# Patient Record
Sex: MALE | Race: WHITE | NOT HISPANIC OR LATINO | Employment: FULL TIME | URBAN - METROPOLITAN AREA
[De-identification: names, ages, dates, MRNs, and addresses within clinical notes are randomized per-mention and may not be internally consistent; named-entity substitution may affect disease eponyms.]

---

## 2017-06-20 RX ORDER — HYDROCODONE BITARTRATE AND ACETAMINOPHEN 7.5; 325 MG/1; MG/1
1 TABLET ORAL 2 TIMES DAILY PRN
COMMUNITY
End: 2017-09-12

## 2017-06-21 ENCOUNTER — OFFICE VISIT (OUTPATIENT)
Dept: NEUROSURGERY | Facility: CLINIC | Age: 27
End: 2017-06-21

## 2017-06-21 VITALS — BODY MASS INDEX: 43.67 KG/M2 | HEIGHT: 70 IN | TEMPERATURE: 97.9 F | WEIGHT: 305 LBS

## 2017-06-21 DIAGNOSIS — M54.16 LUMBAR RADICULOPATHY, RIGHT: Primary | ICD-10-CM

## 2017-06-21 DIAGNOSIS — M47.816 FACET ARTHRITIS OF LUMBAR REGION: ICD-10-CM

## 2017-06-21 DIAGNOSIS — M51.36 DDD (DEGENERATIVE DISC DISEASE), LUMBAR: ICD-10-CM

## 2017-06-21 PROCEDURE — 99243 OFF/OP CNSLTJ NEW/EST LOW 30: CPT | Performed by: NEUROLOGICAL SURGERY

## 2017-06-21 RX ORDER — CYCLOBENZAPRINE HCL 10 MG
10 TABLET ORAL 3 TIMES DAILY PRN
COMMUNITY
End: 2017-09-12

## 2017-06-21 RX ORDER — DICLOFENAC SODIUM 75 MG/1
75 TABLET, DELAYED RELEASE ORAL 2 TIMES DAILY
Qty: 60 TABLET | Refills: 0 | Status: SHIPPED | OUTPATIENT
Start: 2017-06-21 | End: 2017-08-18 | Stop reason: SDUPTHER

## 2017-06-21 RX ORDER — GABAPENTIN 300 MG/1
300 CAPSULE ORAL 3 TIMES DAILY
Qty: 90 CAPSULE | Refills: 0 | Status: SHIPPED | OUTPATIENT
Start: 2017-06-21 | End: 2017-08-18 | Stop reason: SDUPTHER

## 2017-06-21 RX ORDER — DIPHENHYDRAMINE HCL 25 MG
25 CAPSULE ORAL EVERY 6 HOURS PRN
Status: ON HOLD | COMMUNITY
End: 2019-10-03

## 2017-06-21 NOTE — PROGRESS NOTES
"Patient: John Chen  : 1990    Primary Care Provider: Sahil Burnette MD    Requesting Provider: As above      History    Chief Complaint: Back and right leg pain with sensory alteration.    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  He has had pain mostly in the right lower back that extends into the hip and then down the leg where it extends into the top of the right foot.  He's had some tingling and intermittent numbness in the foot as well.  He has no left-sided symptoms.  He reports no bowel or bladder difficulty.  He's been treated with Norco and 2 weeks of physical therapy.  He is worse with standing.  Sitting is also aggravating.  Sometimes he gets relief by lying down.  He has been off work.    Review of Systems   Constitutional: Negative for activity change, appetite change, chills, diaphoresis, fatigue, fever and unexpected weight change.   HENT: Positive for congestion, rhinorrhea, sinus pressure, sneezing and tinnitus. Negative for dental problem, drooling, ear discharge, ear pain, facial swelling, hearing loss, mouth sores, nosebleeds, postnasal drip, sore throat, trouble swallowing and voice change.    Eyes: Negative for photophobia, pain, discharge, redness, itching and visual disturbance.   Respiratory: Positive for cough. Negative for apnea, choking, chest tightness, shortness of breath, wheezing and stridor.    Cardiovascular: Negative for chest pain, palpitations and leg swelling.   Gastrointestinal: Negative for abdominal distention, abdominal pain, anal bleeding, blood in stool, constipation, diarrhea, nausea, rectal pain and vomiting.   Musculoskeletal: Positive for arthralgias, back pain, myalgias, neck pain and neck stiffness. Negative for gait problem and joint swelling.   Skin: Negative for color change, pallor, rash and wound.   Allergic/Immunologic: " "Positive for environmental allergies. Negative for food allergies and immunocompromised state.   Neurological: Positive for headaches. Negative for dizziness, tremors, seizures, syncope, facial asymmetry, speech difficulty, weakness, light-headedness and numbness.   Hematological: Negative for adenopathy. Does not bruise/bleed easily.   Psychiatric/Behavioral: Negative for agitation, behavioral problems, confusion, decreased concentration, dysphoric mood, self-injury, sleep disturbance and suicidal ideas. The patient is not nervous/anxious and is not hyperactive.        The patient's past medical history, past surgical history, family history, and social history have been reviewed at length in the electronic medical record.    Physical Exam:   Temp 97.9 °F (36.6 °C)  Ht 70\" (177.8 cm)  Wt (!) 305 lb (138 kg)  BMI 43.76 kg/m2  CONSTITUTIONAL: Patient is well-nourished, pleasant and appears stated age.  CV: Heart regular rate and rhythm without murmur, rub, or gallop.  PULMONARY: Lungs are clear to ascultation.  MUSCULOSKELETAL:  Straight leg raising is negative.  Sammy's Sign is moderately positive on the right.  ROM in back somewhat limited in flexion and extension.  Tenderness in the back to palpation is rather prominent in the midline and off to the right in the lumbosacral spine  NEUROLOGICAL:  Orientation, memory, attention span, language function, and cognition have been examined and are intact.  Strength is intact in the lower extremities to direct testing.  Muscle tone is normal throughout.  Station and gait are normal.  Sensation is intact to light touch testing throughout.  Deep tendon reflexes are 2+ and symmetrical.  Coordination is intact.      Medical Decision Making    Data Review:   MRI of the lumbar spine dated 4/24/17 demonstrates multilevel degenerative disc disease.  There is mild central disc bulging at L3-4.  Somewhat more broad-based disc bulging at L4-5.  There is broad-based but more " leftward disc bulging at L5-S1.  There is a left paracentral annular fissure at this level.  High-grade root or canal compromise is not observed.    Diagnosis:   1.  Lumbar radiculopathy.  2.  Lumbar degenerative disc disease.  3.  Lumbar degenerative joint disease.    Treatment Options:   I have prescribed Neurontin and diclofenac.  The patient will continue to undergo physical therapy that will include traction.  He is to be off work until follow-up in several weeks.  If his symptoms persist or worsen then a referral for epidural injections would be a consideration.       Diagnosis Plan   1. Lumbar radiculopathy, right  gabapentin (NEURONTIN) 300 MG capsule   2. DDD (degenerative disc disease), lumbar  Ambulatory Referral to Physical Therapy Evaluate and treat, Neuro   3. Facet arthritis of lumbar region  diclofenac (VOLTAREN) 75 MG EC tablet           I, Dr. Wallace, personally performed the services described in the documentation, as scribed in my presence, and it is both accurate and complete.  Scribed for Ernesto Wallace MD by Pankaj Simms CMA on 06/21/2017 at 10:01 AM

## 2017-07-21 ENCOUNTER — OFFICE VISIT (OUTPATIENT)
Dept: NEUROSURGERY | Facility: CLINIC | Age: 27
End: 2017-07-21

## 2017-07-21 VITALS — TEMPERATURE: 97.5 F | WEIGHT: 304 LBS | HEIGHT: 70 IN | BODY MASS INDEX: 43.52 KG/M2

## 2017-07-21 DIAGNOSIS — M51.36 DDD (DEGENERATIVE DISC DISEASE), LUMBAR: Primary | ICD-10-CM

## 2017-07-21 DIAGNOSIS — M54.16 LUMBAR RADICULOPATHY, RIGHT: ICD-10-CM

## 2017-07-21 DIAGNOSIS — M25.551 PAIN OF RIGHT HIP JOINT: ICD-10-CM

## 2017-07-21 DIAGNOSIS — M47.816 FACET ARTHRITIS OF LUMBAR REGION: ICD-10-CM

## 2017-07-21 PROCEDURE — 99213 OFFICE O/P EST LOW 20 MIN: CPT | Performed by: NEUROLOGICAL SURGERY

## 2017-07-21 NOTE — PROGRESS NOTES
Patient: John Chen  : 1990    Primary Care Provider: Sahil Burnette MD    Requesting Provider: As above        History    Chief Complaint: Back and right leg pain with sensory alteration.    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  The pain extends from his back and into his right hip and subsequently down the right leg where he has some sensory alteration.  The Neurontin is making him sleepy but he has continued taking it.  The diclofenac is helpful.  Overall his symptoms are not much changed.  He denies left lower extremity symptoms.    Review of Systems   Constitutional: Negative for activity change, appetite change, chills, diaphoresis, fatigue, fever and unexpected weight change.   HENT: Positive for congestion, rhinorrhea and sneezing. Negative for dental problem, drooling, ear discharge, ear pain, facial swelling, hearing loss, mouth sores, nosebleeds, postnasal drip, sinus pressure, sore throat, tinnitus, trouble swallowing and voice change.    Eyes: Negative for photophobia, pain, discharge, redness, itching and visual disturbance.   Respiratory: Negative for apnea, cough, choking, chest tightness, shortness of breath, wheezing and stridor.    Cardiovascular: Negative for chest pain, palpitations and leg swelling.   Gastrointestinal: Negative for abdominal distention, abdominal pain, anal bleeding, blood in stool, constipation, diarrhea, nausea, rectal pain and vomiting.   Musculoskeletal: Positive for arthralgias and back pain. Negative for gait problem, joint swelling, myalgias, neck pain and neck stiffness.   Skin: Negative for color change, pallor, rash and wound.   Allergic/Immunologic: Positive for environmental allergies. Negative for food allergies and immunocompromised state.   Neurological: Positive for facial asymmetry and numbness. Negative for dizziness, tremors,  "seizures, syncope, speech difficulty, weakness, light-headedness and headaches.   Hematological: Negative for adenopathy. Does not bruise/bleed easily.   Psychiatric/Behavioral: Negative for agitation, behavioral problems, confusion, decreased concentration, dysphoric mood, self-injury, sleep disturbance and suicidal ideas. The patient is not nervous/anxious and is not hyperactive.        The patient's past medical history, past surgical history, family history, and social history have been reviewed at length in the electronic medical record.    Physical Exam:   Temp 97.5 °F (36.4 °C)  Ht 70\" (177.8 cm)  Wt (!) 304 lb (138 kg)  BMI 43.62 kg/m2  MUSCULOSKELETAL:  Straight leg raising is somewhat positive on the right and negative on the left.  Sammy's Sign is negative.  ROM in back normal.  Tenderness in the back to palpation is not observed.  NEUROLOGICAL:  Strength is intact in the lower extremities to direct testing.  Muscle tone is normal throughout.  Station and gait are normal.  Sensation is intact to light touch testing throughout.      Medical Decision Making    Diagnosis:   The patient has right lower extremity radicular symptoms.  He has diffuse degenerative changes in his back with some degree of disc protrusion at multiple levels.  This tends to be more leftward however.    Treatment Options:   I have referred the patient for an epidural injection or 2.  He is to be off work until follow-up thereafter.  If he continues to struggle then I'll probably set up a lumbar CT myelogram to see if there is renny nerve root compromise on the right.       Diagnosis Plan   1. DDD (degenerative disc disease), lumbar  Ambulatory Referral to Pain Management   2. Facet arthritis of lumbar region     3. Lumbar radiculopathy, right     4. Pain of right hip joint             I, Dr. Wallace, personally performed the services described in the documentation, as scribed in my presence, and it is both accurate and " complete.  Scribed for Ernesto Wallace MD by Pankaj Simms CMA on 07/21/2017 at 3:14 PM

## 2017-08-15 ENCOUNTER — TELEPHONE (OUTPATIENT)
Dept: NEUROSURGERY | Facility: CLINIC | Age: 27
End: 2017-08-15

## 2017-08-15 NOTE — TELEPHONE ENCOUNTER
Per Dr. Wallace's note on 07/21/17:  I have referred the patient for an epidural injection or 2.  He is to be off work until follow-up thereafter.  If he continues to struggle then I'll probably set up a lumbar CT myelogram to see if there is renny nerve root compromise on the right.    Since the feedback from the pain management practice reflects that the patient will not be seen for HERMINIO injections for at least 2 months, we can proceed with lumbar myelogram is w/c approves.

## 2017-08-16 NOTE — TELEPHONE ENCOUNTER
Received a call back from the  on the W/C account.  She said that they are willing to approve another referral to another office (one that can get him in sooner) or they would need a request for the Myelo with the office note for approval.  It depends on what Dr. Wallace would like to do.  If you could ask him and let me know, thanks!

## 2017-08-17 DIAGNOSIS — M54.16 LUMBAR RADICULOPATHY, RIGHT: ICD-10-CM

## 2017-08-17 DIAGNOSIS — M47.816 FACET ARTHRITIS OF LUMBAR REGION: ICD-10-CM

## 2017-08-17 NOTE — TELEPHONE ENCOUNTER
Provider:  Rodrigo  Caller: John Chen  Time of call:   02:48 PM  Phone #:  [phone]  Last visit:   07/21/17  Next visit: not scheduled yet, pending pain mgmt    Reason for call:       Pt received a summons for jury duty, would like to know if we can write him a letter excusing him from jury duty due to his back issues, doesn't think he could sit on a hard bench all day.

## 2017-08-18 RX ORDER — DICLOFENAC SODIUM 75 MG/1
75 TABLET, DELAYED RELEASE ORAL 2 TIMES DAILY
Qty: 60 TABLET | Refills: 0 | Status: SHIPPED | OUTPATIENT
Start: 2017-08-18 | End: 2017-08-28 | Stop reason: SDUPTHER

## 2017-08-18 RX ORDER — GABAPENTIN 300 MG/1
300 CAPSULE ORAL 3 TIMES DAILY
Qty: 90 CAPSULE | Refills: 0 | OUTPATIENT
Start: 2017-08-18 | End: 2017-11-16 | Stop reason: SDUPTHER

## 2017-08-18 NOTE — TELEPHONE ENCOUNTER
We are not going to approve a letter for jury duty.  He can take antiinflammatories, use ice/heat for his symptoms.      Called and he was not home to receive my message.  Person who answered phone said she would tell him to call back when he gets home.

## 2017-08-18 NOTE — TELEPHONE ENCOUNTER
I spoke to Dr. Wallace, he said that we can refer him to another pain mgmt clinic, he suggested Dr. Sorensen but said wherever the patient wants to go, I know he lives in [facility], so you might ask if there's somewhere closer to home that he would like to be referred to

## 2017-08-18 NOTE — TELEPHONE ENCOUNTER
Called pt- he is aware.   He request RF for Gabapentin 300mg TID   and Diclofenac 75mg BID.        NIKKI:  04/19/2017 Hydrocodone/Acetaminophen  325MG/7.5MG  1990 30 15 [id number] 15 1  05/09/2017 Hydrocodone/Acetaminophen  325MG/7.5MG  1990 30 15 [id number] 15 1

## 2017-08-21 NOTE — TELEPHONE ENCOUNTER
JOHANNA pt today about the situation.  Pt is ok with referral to [facility] and [facility] is aware to resend for a new auth.    While I was on the phone with the patient he mentioned that he needed a refill on his Gabapentin and Diclofenac.  He has 5 days worth of Gabapentin and 2 pills left on the other.

## 2017-08-28 ENCOUNTER — TELEPHONE (OUTPATIENT)
Dept: NEUROSURGERY | Facility: CLINIC | Age: 27
End: 2017-08-28

## 2017-08-28 DIAGNOSIS — M47.816 FACET ARTHRITIS OF LUMBAR REGION: ICD-10-CM

## 2017-08-28 RX ORDER — DICLOFENAC SODIUM 75 MG/1
75 TABLET, DELAYED RELEASE ORAL 2 TIMES DAILY
Qty: 60 TABLET | Refills: 0 | Status: SHIPPED | OUTPATIENT
Start: 2017-08-28 | End: 2017-11-16 | Stop reason: SDUPTHER

## 2017-08-28 NOTE — TELEPHONE ENCOUNTER
Provider:  Rodrigo Gauthier  Caller:   Time of call:     Phone #:    Surgery:    Surgery Date:    Last visit:   07/21/17  Next visit: NA    Reason for call:         ----- Message from Yohana Murrieta sent at 8/28/2017 10:35 AM EDT -----  Contact: [phone]  PATIENT CALLING TO REQUEST A REFILL OF DICLOFENAC B.I.D.      PATIENT IS IN [facility].

## 2017-08-29 ENCOUNTER — TELEPHONE (OUTPATIENT)
Dept: PAIN MEDICINE | Facility: CLINIC | Age: 27
End: 2017-08-29

## 2017-09-12 ENCOUNTER — OFFICE VISIT (OUTPATIENT)
Dept: PAIN MEDICINE | Facility: CLINIC | Age: 27
End: 2017-09-12

## 2017-09-12 VITALS
HEART RATE: 96 BPM | SYSTOLIC BLOOD PRESSURE: 133 MMHG | HEIGHT: 70 IN | RESPIRATION RATE: 20 BRPM | BODY MASS INDEX: 44.67 KG/M2 | WEIGHT: 312 LBS | TEMPERATURE: 97.2 F | DIASTOLIC BLOOD PRESSURE: 85 MMHG | OXYGEN SATURATION: 96 %

## 2017-09-12 DIAGNOSIS — M47.816 SPONDYLOSIS OF LUMBAR REGION WITHOUT MYELOPATHY OR RADICULOPATHY: ICD-10-CM

## 2017-09-12 DIAGNOSIS — E66.01 MORBID OBESITY DUE TO EXCESS CALORIES (HCC): Primary | ICD-10-CM

## 2017-09-12 DIAGNOSIS — F17.210 CIGARETTE NICOTINE DEPENDENCE WITHOUT COMPLICATION: ICD-10-CM

## 2017-09-12 DIAGNOSIS — M51.26 DISPLACEMENT OF LUMBAR INTERVERTEBRAL DISC: ICD-10-CM

## 2017-09-12 DIAGNOSIS — E66.01 MORBID OBESITY DUE TO EXCESS CALORIES (HCC): ICD-10-CM

## 2017-09-12 DIAGNOSIS — M48.061 SPINAL STENOSIS OF LUMBAR REGION: ICD-10-CM

## 2017-09-12 DIAGNOSIS — M51.26 LUMBAR DISCOGENIC PAIN SYNDROME: ICD-10-CM

## 2017-09-12 PROCEDURE — 99203 OFFICE O/P NEW LOW 30 MIN: CPT | Performed by: ANESTHESIOLOGY

## 2017-09-12 PROCEDURE — 99406 BEHAV CHNG SMOKING 3-10 MIN: CPT | Performed by: ANESTHESIOLOGY

## 2017-09-12 RX ORDER — CETIRIZINE HYDROCHLORIDE 5 MG/1
10 TABLET ORAL DAILY
Status: ON HOLD | COMMUNITY
End: 2019-10-03

## 2017-09-12 NOTE — PROGRESS NOTES
"Chief Complaint: \"Pain in my lower back and in my legs.\"    History of Present Illness:   Patient: Mr. John Chen, 27 y.o. male   Referring physician: Dr. Ernesto Wallace  Reason for referral: Consultation for intractable chronic lower back pain radiating into both hips and legs.  Pain history: Patient reports a 5-month history of pain, which began after a work related incident repositioning a resident on 4/12/17. Pain has progressed in intensity over the past 5 months.   Pain description: Constant pain with intermittent exacerbation, described as burning, sharp and throbbing sensation.   Radiation of pain: The lower back pain radiates into the anterior and lateral aspect of the hips, anterior aspect of his thighs, and on the left side goes down the lateral aspect of the leg and into the dorsum of his left foot.  Pain intensity today: 6/10  Average pain intensity last week: 7/10  Pain intensity ranges from: 5/10 to 8/10  Aggravating factors: Pain increases with twisting, bending, sitting longer than 30-45 minutes, standing longer than 30-45 minutes, ambulating more than 5-10 minutes and remaining in one position.   Alleviating factors: Pain decreases with lying, heat and ice, massage, changing positions.   Associated symptoms:   Patient denies numbness or weakness in his lower extremities except for intermittent numbness in his feet   Patient denies any new bladder or bowel problems.   Patient denies difficulties with his balance or recent falls.     Review of previous therapies and additional medical records:  John Chen has already failed the following measures, including:   Conservative measures: oral analgesics, opioids, massage, physical therapy, ice, heat and traction   Interventional measures: None  Surgical measures: No previous lumbar spine surgery   John Chen underwent neurosurgical consultation with Dr. Ernesto Wallace on 07/21/2017, and was found not to be a surgical candidate.  John Chen presents with " significant comorbidities including nicotine addiction, not engaged in treatment, morbid obesity, asthma, possible alcohol abuse, use engaged in treatment.  In terms of current analgesics, John Chne takes: Diclofenac,Gabapentin  I have reviewed [facility] Report #[id number] consistent to medication reconciliation.    Review of diagnostic Studies:    MRI Lumbar Spine without Contrast, 04/25/2017. I have reviewed images of his MRI. Lumbar vertebral bodies are normal in height and alignment.  Mild spondylosis. The conus medial areas terminates at L1. Moderate degenerative disc disease at L3-L4, L4-L5 and L5-S1, with moderate diffuse bulging annulus. Mild to moderate facet arthropathy at L3-L4, L4-L5 and L5-S1, bilaterally, with ligamentum flavum buckling. Mild central spinal canal stenosis and lateral recess stenosis at L3-L4, L4-L5 and L5-S1.  L3-L4: Small broad-based central disc protrusion with mild impingement upon the anterior thecal sac.  L4-L5: Central disc protrusion without significant impingement upon the thecal sac.  L5-S1: Left paracentral disc protrusion with mild impingement upon the anterior thecal sac.    Review of Systems   Musculoskeletal: Positive for arthralgias and back pain.   All other systems reviewed and are negative.     Patient Active Problem List   Diagnosis   • Spondylosis of lumbar region without myelopathy or radiculopathy   • Displacement of lumbar intervertebral disc   • Morbid obesity due to excess calories   • Cigarette nicotine dependence without complication   • Spinal stenosis of lumbar region   • Lumbar discogenic pain syndrome       Past Medical History:   Diagnosis Date   • Asthma    • Extremity pain    • Joint pain    • Low back pain          Past Surgical History:   Procedure Laterality Date   • TONSILLECTOMY AND ADENOIDECTOMY           Family History   Problem Relation Age of Onset   • No Known Problems Mother    • Hypertension Father          Social History     Social History  "  • Marital status: Single     Spouse name: N/A   • Number of children: N/A   • Years of education: N/A     Social History Main Topics   • Smoking status: Current Every Day Smoker     Packs/day: 0.50     Types: Cigarettes   • Smokeless tobacco: None   • Alcohol use Yes   • Drug use: No   • Sexual activity: Not Asked     Other Topics Concern   • None     Social History Narrative        Current Outpatient Prescriptions:   •  cetirizine (zyrTEC) 5 MG tablet, Take 10 mg by mouth Daily., Disp: , Rfl:   •  diclofenac (VOLTAREN) 75 MG EC tablet, Take 1 tablet by mouth 2 (Two) Times a Day., Disp: 60 tablet, Rfl: 0  •  diphenhydrAMINE (BENADRYL) 25 mg capsule, Take 25 mg by mouth Every 6 (Six) Hours As Needed for Itching., Disp: , Rfl:   •  gabapentin (NEURONTIN) 300 MG capsule, Take 1 capsule by mouth 3 (Three) Times a Day., Disp: 90 capsule, Rfl: 0      No Known Allergies      /85 (BP Location: Left arm, Patient Position: Sitting)  Pulse 96  Temp 97.2 °F (36.2 °C) (Temporal Artery )   Resp 20  Ht 70\" (177.8 cm)  Wt (!) 312 lb (142 kg)  SpO2 96%  BMI 44.77 kg/m2      Physical Exam:  Constitutional: Patient is oriented to person, place, and time. Vital signs are normal. Patient appears well-developed and well-nourished.   HENT: Head: Normocephalic and atraumatic. Eyes: Conjunctivae and lids are normal. Pupils: Equal, round, reactive to light.   Neck: Trachea normal. Neck supple. No JVD present.   Pulmonary Respiratory effort: No increased work of breathing or signs of respiratory distress. Auscultation of lungs: Clear to auscultation.   Cardiovascular Auscultation of heart: Normal rate and rhythm, normal S1 and S2, no murmurs.   Abdomen: The abdomen was soft and nontender. Bowel sounds were normal.   Musculoskeletal   Gait and station: Gait evaluation demonstrated a normal gait   Lumbar spine: The range of motion of the lumbar spine is limited secondary to pain. Extension, flexion, lateral flexion, rotation of " the lumbar spine increased and reproduced pain. Lumbar facet joint loading maneuvers are positive.  Sammy and Gaenslen's tests are negative   Piriformis maneuvers are negative   Palpation of the bilateral ischial tuberosities, unrevealing   Palpation of the bilateral greater trochanter, unrevealing   Examination of the Iliotibial band: unrevealing   Hip joints: The range of motion of the hip joints is full and without pain   Neurological: Patient is alert and oriented to person, place, and time. Speech: speech is normal. Cortical function: Normal mental status.   Cranial nerves: Cranial nerves 2-12 intact.   Reflex Scores:  Right patellar: 3+  Left patellar: 3+  Right achilles: 2+  Left achilles: 2+  Motor strength: 5/5  Motor Tone: normal tone.   Involuntary movements: none.   Superficial/Primitive Reflexes: primitive reflexes were absent.   Right Knott: absent  Left Knott: absent  Right ankle clonus: absent  Left ankle clonus: absent   No nuchal rigidity. Negative Kernig and Brudzinski.  Spurling sign is negative. Lhermitte sign is negative. Negative long tract signs. Straight leg raising test is negative. Femoral stretch sign is negative.   Sensation: No sensory loss. Sensory exam: intact to light touch, intact pain and temperature sensation, intact vibration sensation and normal proprioception.   Coordination: Normal finger to nose and heel to shin. Normal balance and negative. Romberg's sign negative.   Skin and subcutaneous tissue: Skin is warm and intact. No rash noted. No cyanosis.   Psychiatric: Judgment and insight: Normal. Orientation to person, place and time: Normal. Recent and remote memory: Intact. Mood and affect: Normal.     ASSESSMENT:   1. Displacement of lumbar intervertebral disc    2. Lumbar discogenic pain syndrome    3. Spinal stenosis of lumbar region    4. Spondylosis of lumbar region without myelopathy or radiculopathy    5. Morbid obesity due to excess calories    6. Cigarette  nicotine dependence without complication      PLAN/MEDICAL DECISION MAKING: I had a lengthy conversation with Mr. John Chen regarding his chronic pain condition and potential therapeutic options including risks, benefits, alternative therapies, to name a few. Patient has failed to obtain pain relief with conservative measures, as referenced above. I have reviewed all available patient's medical records as well as previous therapies as referenced above. Patient developed lower back and lower extremity pain after a work-related injured repositioning a resident on 04/12/2017. Patient works as a CNA. MRI reveals moderate degenerative disc disease at L3-L4, L4-L5 and L5-S1, with moderate diffuse bulging annulus. Moderate facet arthropathy at L3-L4, L4-L5 and L5-S1. Ligamentum flavum hypertrophy. Mild central spinal canal stenosis and lateral recess stenosis at L3-L4, L4-L5 and L5-S1. Various degrees of protrusion at L3-L4, L4-L5, and L5-S1. L5-S1: Left paracentral disc protrusion with mild impingement upon the anterior thecal sac. Therefore, I have proposed the following plan:  1. Pharmacological measures: Reviewed. Discussed. Patient takes diclofenac and gabapentin  2. Interventional pain management measures: Patient will be scheduled for diagnostic and therapeutic bilateral L4-L5 transforaminal epidural steroid injection. We may repeat another epidural depending on patient's outcome.  Patient will follow-up with Dr. Ernesto Wallace thereafter. If he continues to struggle with pain, then, Dr. Wallace has discussed the possibility of lumbar CT myelogram.   3. Long-term rehabilitation efforts:  A. Patient will start a comprehensive physical therapy program for water therapy, therapeutic exercise, core strengthening, gait and balance training, neurodynamics, myofascial release, cupping and dry needling   B. Start an exercise program such as yoga, Pilates and water therapy  C. Contrast therapy: Apply ice-packs for 15-20  minutes, followed by heating pads for 15-20 minutes to affected area   D. Referral to [facility]  E. Patient has been screened for tobacco use: Current tobacco user . Smoking Cessation: I have advised the patient at length regarding the long-term deleterious effects of smoking and have provided the patient lifestyle modification strategies to facilitate smoking cessation. For instance, I have instructed the patient to delay the time that he lights his first cigarette in the morning from 1 hour to 2 hours and to continue pushing back 30-60 minutes, if possible, every day for the rest of the week. We have also discussed pharmacological interventions in addition to participation in support groups, individual counseling, to name a few to facilitate smoking/nicotine cessation. I spent approximately 5 minutes advising the patient. In addition, I have facilitated the following referrals:  · Referral to Dr. Tj Cruz for individual therapy for smoking cessation   · Referral to [facility] Sm[facility] Cessation [facility]   4.  The patient has been instructed to contact my office with any questions or difficulties. The patient understands the plan and agrees to proceed accordingly.       Patient Care Team:  Sahil Burnette MD as PCP - General (Family Medicine)  Yasmin Gibson MD as Consulting Physician (Internal Medicine)  Ernesto Wallace MD as Consulting Physician (Neurosurgery)  Gino Sorensen MD as Consulting Physician (Pain Medicine)     New Medications Ordered This Visit   Medications   • cetirizine (zyrTEC) 5 MG tablet     Sig: Take 10 mg by mouth Daily.         No future appointments.      Gino Sorensen MD     EMR Dragon/Transcription disclaimer:  Much of this encounter note is an electronic transcription of spoken language to printed text. Electronic transcription of spoken language may permit erroneous, or at times, nonsensical words or phrases to be inadvertently  transcribed. Although I have reviewed the note for such errors, some may still exist.

## 2017-11-15 ENCOUNTER — OUTSIDE FACILITY SERVICE (OUTPATIENT)
Dept: PAIN MEDICINE | Facility: CLINIC | Age: 27
End: 2017-11-15

## 2017-11-15 PROCEDURE — 64483 NJX AA&/STRD TFRM EPI L/S 1: CPT | Performed by: ANESTHESIOLOGY

## 2017-11-15 PROCEDURE — 99152 MOD SED SAME PHYS/QHP 5/>YRS: CPT | Performed by: ANESTHESIOLOGY

## 2017-11-16 DIAGNOSIS — M54.16 LUMBAR RADICULOPATHY, RIGHT: ICD-10-CM

## 2017-11-16 DIAGNOSIS — M47.816 FACET ARTHRITIS OF LUMBAR REGION: ICD-10-CM

## 2017-11-16 RX ORDER — GABAPENTIN 300 MG/1
300 CAPSULE ORAL 3 TIMES DAILY
Qty: 90 CAPSULE | Refills: 0 | OUTPATIENT
Start: 2017-11-16 | End: 2018-05-02

## 2017-11-16 RX ORDER — DICLOFENAC SODIUM 75 MG/1
75 TABLET, DELAYED RELEASE ORAL 2 TIMES DAILY
Qty: 60 TABLET | Refills: 0 | Status: SHIPPED | OUTPATIENT
Start: 2017-11-16 | End: 2018-05-02

## 2017-11-16 NOTE — TELEPHONE ENCOUNTER
RX for gabapentin 300mg TID  called into [facility]- Diclofenac 75mg BID Escribed to pharmacy.   PT aware.     Encounter closed.

## 2017-11-16 NOTE — TELEPHONE ENCOUNTER
We can do refill; he is following through with the plan that Dr. Wallace had set up.  Please call in neurontin

## 2017-11-16 NOTE — TELEPHONE ENCOUNTER
Does he have a follow up scheduled with Dr. Wallace? He hasn't been seen since July. If we are not continuing to follow him, we should not be doing refills.

## 2017-11-16 NOTE — TELEPHONE ENCOUNTER
No f/u for pt- Pt is now seeing -     OV NOTE   Treatment Options:   I have referred the patient for an epidural injection or 2.  He is to be off work until follow-up thereafter.  If he continues to struggle then I'll probably set up a lumbar CT myelogram to see if there is renny nerve root compromise on the right.    Pt had 1st INJ done yesterday.      Have pt called [facility] for RF on medication RF?

## 2017-11-16 NOTE — TELEPHONE ENCOUNTER
Provider: Rodrigo    Caller: pt  Time of call: 11:30  Phone #:  [phone]  Surgery:  N/A  Last visit:7/21/17     Next visit: No Show 11/10/17         Reason for call: Pt states he is out of diclofenac and almost out of gabapentin and would like a refill on both.

## 2018-02-19 ENCOUNTER — TELEPHONE (OUTPATIENT)
Dept: NEUROSURGERY | Facility: CLINIC | Age: 28
End: 2018-02-19

## 2018-02-19 DIAGNOSIS — M54.16 LUMBAR RADICULOPATHY, RIGHT: Primary | ICD-10-CM

## 2018-02-19 DIAGNOSIS — M51.36 DDD (DEGENERATIVE DISC DISEASE), LUMBAR: ICD-10-CM

## 2018-02-19 NOTE — TELEPHONE ENCOUNTER
Provider:  Rodrigo  Caller: John Chen  Time of call:   02:11 PM  Phone #:  [phone]  Last visit:   07/21/17  Next visit: PRN    Reason for call:       Pt was trying to get back into physical therapy, said the PT place he goes to told him that they need an updated order since the one he has is from last June.     I went ahead and pended a new PT order if that's alright

## 2018-04-23 ENCOUNTER — TELEPHONE (OUTPATIENT)
Dept: NEUROSURGERY | Facility: CLINIC | Age: 28
End: 2018-04-23

## 2018-04-23 NOTE — TELEPHONE ENCOUNTER
Pt has not been compliant with his worker's comp claim.  They would like to set up another appointment with Dr. Wallace to evaluate MMI or return to work status.    He went to a few sessions of PT and had one injection but has otherwise not done anything.  He tried to see several other doctors who refused to see him because he had treated with Dr. Wallace.    Is it ok to go ahead and schedule an appointment for him?

## 2018-04-23 NOTE — TELEPHONE ENCOUNTER
Probably need to see a PA when dr glass is here since he hasn't been seen since last summer and no showed his last appt.

## 2018-04-24 NOTE — TELEPHONE ENCOUNTER
Pt has been scheduled for an appointment with [facility] next week.  W/C nurse is aware and will contact the patient with the information.

## 2018-05-02 ENCOUNTER — OFFICE VISIT (OUTPATIENT)
Dept: NEUROSURGERY | Facility: CLINIC | Age: 28
End: 2018-05-02

## 2018-05-02 VITALS
SYSTOLIC BLOOD PRESSURE: 140 MMHG | HEIGHT: 70 IN | DIASTOLIC BLOOD PRESSURE: 82 MMHG | TEMPERATURE: 98.3 F | BODY MASS INDEX: 44.95 KG/M2 | HEART RATE: 99 BPM | WEIGHT: 314 LBS | OXYGEN SATURATION: 99 %

## 2018-05-02 DIAGNOSIS — M47.816 FACET ARTHRITIS OF LUMBAR REGION: ICD-10-CM

## 2018-05-02 DIAGNOSIS — M51.36 DDD (DEGENERATIVE DISC DISEASE), LUMBAR: ICD-10-CM

## 2018-05-02 DIAGNOSIS — M54.16 LUMBAR RADICULOPATHY, RIGHT: Primary | ICD-10-CM

## 2018-05-02 PROCEDURE — 99213 OFFICE O/P EST LOW 20 MIN: CPT | Performed by: PHYSICIAN ASSISTANT

## 2018-05-02 RX ORDER — IBUPROFEN 400 MG/1
400 TABLET ORAL EVERY 8 HOURS PRN
Status: ON HOLD | COMMUNITY
End: 2019-10-03

## 2018-05-02 NOTE — PROGRESS NOTES
"Patient: John Chen  : 1990  Chart #: [id number]    Date of Service: 2018    CHIEF COMPLAINT: Low back and left leg pain with sensory alteration    History of Present Illness Mr. 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  He was evaluated in our office a year ago with complaints of low back and right leg pain.  His studies at that time demonstrated degenerative disc disease with disc protrusions at multiple levels, mostly on the left side however his symptoms were right-sided.  He was referred for epidural steroid injections which made his symptoms worse.  Today he complains of pain in the low back extending into the left hip and down the side of the leg to the shin and top of his foot.  Symptoms he has some pain in the right hip but no symptoms down the leg.  He has continued with formal physical therapy.  He was cycling a couple weeks ago and felt some transient numbness in his groin bilaterally which made him nervous.  No bowel or bladder difficulties.  No weakness.  He has tried Neurontin but does not take this routinely due to somnolence.  His leg pain has become particularly bothersome and he would like to get it resolved prior to starting school in the fall. He is not currently working.     I reviewed the following portions of the patient's history and updated as appropriate: allergies, current medications, past family history, past medical history, past social history, past surgical history and problem list.    Review of Systems   HENT: Positive for sinus pain and sinus pressure.    Musculoskeletal: Positive for arthralgias, back pain, neck pain and neck stiffness.   Allergic/Immunologic: Positive for environmental allergies.   All other systems reviewed and are negative.      Objective   Vital Signs: Blood pressure 140/82, pulse 99, temperature 98.3 °F (36.8 °C), temperature source Temporal Artery , height 177.8 cm (70\"), " weight (!) 142 kg (314 lb), SpO2 99 %.  Physical Exam   Constitutional: He appears well-developed and well-nourished.   Cardiovascular: Normal rate, regular rhythm and normal heart sounds.    Pulmonary/Chest: Effort normal and breath sounds normal.   Skin: Skin is warm and dry.   Nursing note and vitals reviewed.  Musculoskeletal:  Strength is intact in upper and lower extremities to direct testing.  Station and gait are normal.  Straight leg raising is negative.   Neurologic:  Muscle tone is normal throughout.  Coordination is intact.  Deep tendon reflexes: 2+ and symmetrical.  Sensation is intact to light touch throughout.  Patient is oriented to person, place, and time.    Assessment/Plan    Diagnosis: Degenerative disc disease with left sided radicular complaints refractory to conservative measures.      Medical Decision Making: Patient's last MRI was from a year ago and demonstrated diffuse degenerative disc disease with disc protrusions at multiple levels, predominantly left sided. At the time his symptoms were more so on the right.  Now his pain involves the left leg. We will set up a new MRI of the lumbar spine and have him follow up with Dr. Wallace for further recommendation.           John was seen today for follow-up.    Diagnoses and all orders for this visit:    Lumbar radiculopathy, right  -     MRI Lumbar Spine Without Contrast; Future    DDD (degenerative disc disease), lumbar  -     MRI Lumbar Spine Without Contrast; Future    Facet arthritis of lumbar region  -     MRI Lumbar Spine Without Contrast; Future               Keisha Lang PA-C  Patient Care Team:  Sahil Burnette MD as PCP - General (Family Medicine)  Yasmin Gibson MD as Consulting Physician (Internal Medicine)  Ernesto Wallace MD as Consulting Physician (Neurosurgery)  Gino Sorensen MD as Consulting Physician (Pain Medicine)

## 2018-05-11 ENCOUNTER — HOSPITAL ENCOUNTER (OUTPATIENT)
Dept: MRI IMAGING | Facility: HOSPITAL | Age: 28
Discharge: HOME | End: 2018-05-11
Admitting: PHYSICIAN ASSISTANT

## 2018-05-11 ENCOUNTER — OFFICE VISIT (OUTPATIENT)
Dept: NEUROSURGERY | Facility: CLINIC | Age: 28
End: 2018-05-11

## 2018-05-11 VITALS — WEIGHT: 308 LBS | BODY MASS INDEX: 44.09 KG/M2 | TEMPERATURE: 97.9 F | HEIGHT: 70 IN

## 2018-05-11 DIAGNOSIS — M47.816 FACET ARTHRITIS OF LUMBAR REGION: ICD-10-CM

## 2018-05-11 DIAGNOSIS — M54.16 LUMBAR RADICULOPATHY, RIGHT: ICD-10-CM

## 2018-05-11 DIAGNOSIS — M51.36 DDD (DEGENERATIVE DISC DISEASE), LUMBAR: ICD-10-CM

## 2018-05-11 DIAGNOSIS — M51.36 BULGING LUMBAR DISC: ICD-10-CM

## 2018-05-11 DIAGNOSIS — M51.36 DDD (DEGENERATIVE DISC DISEASE), LUMBAR: Primary | ICD-10-CM

## 2018-05-11 PROCEDURE — 99213 OFFICE O/P EST LOW 20 MIN: CPT | Performed by: NEUROLOGICAL SURGERY

## 2018-05-11 PROCEDURE — 72148 MRI LUMBAR SPINE W/O DYE: CPT

## 2018-05-11 NOTE — PROGRESS NOTES
Patient: John Chen  : 1990    Primary Care Provider: Sahil Burnette MD    Requesting Provider: As above        History    Chief Complaint: Low back and bilateral lower extremity pain.    History of Present Illness: Mr. 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  He was evaluated in our office a year ago with complaints of low back and right leg pain.  His studies at that time demonstrated degenerative disc disease with disc protrusions at multiple levels, mostly on the left side however his symptoms were right-sided.  He was referred for epidural steroid injections which made his symptoms worse.  Today he complains of pain in the low back extending into the left hip and down the side of the leg to the shin and top of his foot.   He has some pain in the right hip that extends into the right anterior thigh.  He has continued with formal physical therapy.  He was cycling a couple weeks ago and felt some transient numbness in his groin bilaterally which made him nervous.  No bowel or bladder difficulties.  No weakness.  He has tried Neurontin but does not take this routinely due to somnolence.  His leg pain has become particularly bothersome and he would like to get it resolved prior to starting school in the fall. He is not currently working.        Review of Systems   Constitutional: Negative for activity change, appetite change, chills, diaphoresis, fatigue, fever and unexpected weight change.   HENT: Positive for sinus pain and sinus pressure. Negative for congestion, dental problem, drooling, ear discharge, ear pain, facial swelling, hearing loss, mouth sores, nosebleeds, postnasal drip, rhinorrhea, sneezing, sore throat, tinnitus, trouble swallowing and voice change.    Eyes: Negative for photophobia, pain, discharge, redness, itching and visual disturbance.   Respiratory: Negative for apnea, cough, choking, chest tightness, shortness of  "breath, wheezing and stridor.    Cardiovascular: Negative for chest pain, palpitations and leg swelling.   Gastrointestinal: Negative for abdominal distention, abdominal pain, anal bleeding, blood in stool, constipation, diarrhea, nausea, rectal pain and vomiting.   Endocrine: Negative for cold intolerance, heat intolerance, polydipsia, polyphagia and polyuria.   Genitourinary: Negative for decreased urine volume, difficulty urinating, dysuria, enuresis, flank pain, frequency, genital sores, hematuria and urgency.   Musculoskeletal: Positive for arthralgias, back pain, neck pain and neck stiffness. Negative for gait problem, joint swelling and myalgias.   Skin: Negative for color change, pallor, rash and wound.   Allergic/Immunologic: Positive for environmental allergies. Negative for food allergies and immunocompromised state.   Neurological: Negative for dizziness, tremors, seizures, syncope, facial asymmetry, speech difficulty, weakness, light-headedness, numbness and headaches.   Hematological: Negative for adenopathy. Does not bruise/bleed easily.   Psychiatric/Behavioral: Negative for agitation, behavioral problems, confusion, decreased concentration, dysphoric mood, hallucinations, self-injury, sleep disturbance and suicidal ideas. The patient is not nervous/anxious and is not hyperactive.    All other systems reviewed and are negative.      The patient's past medical history, past surgical history, family history, and social history have been reviewed at length in the electronic medical record.    Physical Exam:   Temp 97.9 °F (36.6 °C) (Temporal Artery )   Ht 177.8 cm (70\")   Wt (!) 140 kg (308 lb)   BMI 44.19 kg/m²   MUSCULOSKELETAL:  Straight leg raising is negative.  Sammy's Sign is negative.  ROM in back is normal.  Tenderness in the back to palpation is not observed.  NEUROLOGICAL:  Strength is intact in the lower extremities to direct testing.  Muscle tone is normal throughout.  Station and gait " are normal.  Sensation is intact to light touch testing throughout.      Medical Decision Making    Data Review:   Follow-up MRI demonstrates some loss of signal at L3-4, L4 to 5, and L5-S1.  There is some broad-based somewhat central disc bulging at L5-S1.  High-grade root or canal compromise is not noted.    Diagnosis:   The patient has some mechanical back pain due to degenerative disc and degenerative joint disease.  I don't see a radiographic correlate that would explain his lower extremity symptoms.    Treatment Options:   There is no role for surgical intervention in this setting and treatment will need to remain symptomatic.  From a work standpoint he could pursue a medium demand type of work environment.  He will follow-up in my clinic on an as-needed basis.       Diagnosis Plan   1. DDD (degenerative disc disease), lumbar     2. Facet arthritis of lumbar region     3. Bulging lumbar disc         Scribed for Ernesto Wallace MD by Eneida Aranda CMA on 05/11/2018 at 12:57 PM      I, Dr. Wallace, personally performed the services described in the documentation, as scribed in my presence, and it is both accurate and complete.

## 2019-10-03 ENCOUNTER — HOSPITAL ENCOUNTER (INPATIENT)
Facility: HOSPITAL | Age: 29
LOS: 4 days | Discharge: HOME | End: 2019-10-07
Attending: PSYCHIATRY & NEUROLOGY | Admitting: PSYCHIATRY & NEUROLOGY

## 2019-10-03 ENCOUNTER — HOSPITAL ENCOUNTER (EMERGENCY)
Facility: HOSPITAL | Age: 29
Discharge: STILL ADMITTED OR ADMINISTRATIVE | End: 2019-10-03
Attending: FAMILY MEDICINE

## 2019-10-03 VITALS
HEIGHT: 70 IN | WEIGHT: 290 LBS | DIASTOLIC BLOOD PRESSURE: 78 MMHG | TEMPERATURE: 98.4 F | SYSTOLIC BLOOD PRESSURE: 162 MMHG | RESPIRATION RATE: 19 BRPM | BODY MASS INDEX: 41.52 KG/M2 | OXYGEN SATURATION: 98 % | HEART RATE: 92 BPM

## 2019-10-03 DIAGNOSIS — F32.A DEPRESSION WITH SUICIDAL IDEATION: Primary | ICD-10-CM

## 2019-10-03 DIAGNOSIS — R45.851 DEPRESSION WITH SUICIDAL IDEATION: Primary | ICD-10-CM

## 2019-10-03 LAB
6-ACETYL MORPHINE: NEGATIVE
ALBUMIN SERPL-MCNC: 4.15 G/DL (ref 3.5–5.2)
ALBUMIN/GLOB SERPL: 1.1 G/DL
ALP SERPL-CCNC: 95 U/L (ref 39–117)
ALT SERPL W P-5'-P-CCNC: 47 U/L (ref 1–41)
AMPHET+METHAMPHET UR QL: NEGATIVE
ANION GAP SERPL CALCULATED.3IONS-SCNC: 16.4 MMOL/L (ref 5–15)
AST SERPL-CCNC: 29 U/L (ref 1–40)
BARBITURATES UR QL SCN: NEGATIVE
BASOPHILS # BLD AUTO: 0.03 10*3/MM3 (ref 0–0.2)
BASOPHILS NFR BLD AUTO: 0.4 % (ref 0–1.5)
BENZODIAZ UR QL SCN: NEGATIVE
BILIRUB SERPL-MCNC: <0.2 MG/DL (ref 0.2–1.2)
BILIRUB UR QL STRIP: NEGATIVE
BUN BLD-MCNC: 12 MG/DL (ref 6–20)
BUN/CREAT SERPL: 12.5 (ref 7–25)
BUPRENORPHINE SERPL-MCNC: NEGATIVE NG/ML
CALCIUM SPEC-SCNC: 9 MG/DL (ref 8.6–10.5)
CANNABINOIDS SERPL QL: NEGATIVE
CHLORIDE SERPL-SCNC: 106 MMOL/L (ref 98–107)
CLARITY UR: CLEAR
CO2 SERPL-SCNC: 21.6 MMOL/L (ref 22–29)
COCAINE UR QL: NEGATIVE
COLOR UR: YELLOW
CREAT BLD-MCNC: 0.96 MG/DL (ref 0.76–1.27)
DEPRECATED RDW RBC AUTO: 46.5 FL (ref 37–54)
EOSINOPHIL # BLD AUTO: 0.17 10*3/MM3 (ref 0–0.4)
EOSINOPHIL NFR BLD AUTO: 2.3 % (ref 0.3–6.2)
ERYTHROCYTE [DISTWIDTH] IN BLOOD BY AUTOMATED COUNT: 14.3 % (ref 12.3–15.4)
ETHANOL BLD-MCNC: 51 MG/DL (ref 0–10)
ETHANOL UR QL: 0.05 %
GFR SERPL CREATININE-BSD FRML MDRD: 93 ML/MIN/1.73
GLOBULIN UR ELPH-MCNC: 3.9 GM/DL
GLUCOSE BLD-MCNC: 133 MG/DL (ref 65–99)
GLUCOSE UR STRIP-MCNC: NEGATIVE MG/DL
HCT VFR BLD AUTO: 48.5 % (ref 37.5–51)
HGB BLD-MCNC: 16 G/DL (ref 13–17.7)
HGB UR QL STRIP.AUTO: NEGATIVE
IMM GRANULOCYTES # BLD AUTO: 0.02 10*3/MM3 (ref 0–0.05)
IMM GRANULOCYTES NFR BLD AUTO: 0.3 % (ref 0–0.5)
KETONES UR QL STRIP: NEGATIVE
LEUKOCYTE ESTERASE UR QL STRIP.AUTO: NEGATIVE
LYMPHOCYTES # BLD AUTO: 2.51 10*3/MM3 (ref 0.7–3.1)
LYMPHOCYTES NFR BLD AUTO: 34.3 % (ref 19.6–45.3)
MAGNESIUM SERPL-MCNC: 2.1 MG/DL (ref 1.6–2.6)
MCH RBC QN AUTO: 29.5 PG (ref 26.6–33)
MCHC RBC AUTO-ENTMCNC: 33 G/DL (ref 31.5–35.7)
MCV RBC AUTO: 89.5 FL (ref 79–97)
METHADONE UR QL SCN: NEGATIVE
MONOCYTES # BLD AUTO: 0.38 10*3/MM3 (ref 0.1–0.9)
MONOCYTES NFR BLD AUTO: 5.2 % (ref 5–12)
NEUTROPHILS # BLD AUTO: 4.2 10*3/MM3 (ref 1.7–7)
NEUTROPHILS NFR BLD AUTO: 57.5 % (ref 42.7–76)
NITRITE UR QL STRIP: NEGATIVE
OPIATES UR QL: NEGATIVE
OXYCODONE UR QL SCN: NEGATIVE
PCP UR QL SCN: NEGATIVE
PH UR STRIP.AUTO: 6.5 [PH] (ref 5–8)
PLATELET # BLD AUTO: 378 10*3/MM3 (ref 140–450)
PMV BLD AUTO: 9.8 FL (ref 6–12)
POTASSIUM BLD-SCNC: 4.3 MMOL/L (ref 3.5–5.2)
PROT SERPL-MCNC: 8 G/DL (ref 6–8.5)
PROT UR QL STRIP: NEGATIVE
RBC # BLD AUTO: 5.42 10*6/MM3 (ref 4.14–5.8)
SODIUM BLD-SCNC: 144 MMOL/L (ref 136–145)
SP GR UR STRIP: 1.02 (ref 1–1.03)
UROBILINOGEN UR QL STRIP: NORMAL
WBC NRBC COR # BLD: 7.31 10*3/MM3 (ref 3.4–10.8)

## 2019-10-03 PROCEDURE — 99284 EMERGENCY DEPT VISIT MOD MDM: CPT

## 2019-10-03 PROCEDURE — 80307 DRUG TEST PRSMV CHEM ANLYZR: CPT | Performed by: PHYSICIAN ASSISTANT

## 2019-10-03 PROCEDURE — 83735 ASSAY OF MAGNESIUM: CPT | Performed by: PHYSICIAN ASSISTANT

## 2019-10-03 PROCEDURE — 85025 COMPLETE CBC W/AUTO DIFF WBC: CPT | Performed by: PHYSICIAN ASSISTANT

## 2019-10-03 PROCEDURE — 81003 URINALYSIS AUTO W/O SCOPE: CPT | Performed by: PHYSICIAN ASSISTANT

## 2019-10-03 PROCEDURE — 80053 COMPREHEN METABOLIC PANEL: CPT | Performed by: PHYSICIAN ASSISTANT

## 2019-10-03 PROCEDURE — 93005 ELECTROCARDIOGRAM TRACING: CPT | Performed by: PSYCHIATRY & NEUROLOGY

## 2019-10-03 RX ORDER — TRAZODONE HYDROCHLORIDE 50 MG/1
50 TABLET ORAL NIGHTLY PRN
Status: DISCONTINUED | OUTPATIENT
Start: 2019-10-03 | End: 2019-10-07 | Stop reason: HOSPADM

## 2019-10-03 RX ORDER — HYDROXYZINE PAMOATE 50 MG/1
50 CAPSULE ORAL NIGHTLY
COMMUNITY

## 2019-10-03 RX ORDER — NICOTINE 21 MG/24HR
1 PATCH, TRANSDERMAL 24 HOURS TRANSDERMAL
Status: DISCONTINUED | OUTPATIENT
Start: 2019-10-03 | End: 2019-10-07 | Stop reason: HOSPADM

## 2019-10-03 RX ORDER — LOPERAMIDE HYDROCHLORIDE 2 MG/1
2 CAPSULE ORAL
Status: DISCONTINUED | OUTPATIENT
Start: 2019-10-03 | End: 2019-10-07 | Stop reason: HOSPADM

## 2019-10-03 RX ORDER — HYDROXYZINE 50 MG/1
50 TABLET, FILM COATED ORAL EVERY 6 HOURS PRN
Status: DISCONTINUED | OUTPATIENT
Start: 2019-10-03 | End: 2019-10-07 | Stop reason: HOSPADM

## 2019-10-03 RX ORDER — ALBUTEROL SULFATE 90 UG/1
2 AEROSOL, METERED RESPIRATORY (INHALATION) EVERY 4 HOURS PRN
COMMUNITY

## 2019-10-03 RX ORDER — BENZTROPINE MESYLATE 1 MG/ML
1 INJECTION INTRAMUSCULAR; INTRAVENOUS ONCE AS NEEDED
Status: DISCONTINUED | OUTPATIENT
Start: 2019-10-03 | End: 2019-10-07 | Stop reason: HOSPADM

## 2019-10-03 RX ORDER — IBUPROFEN 400 MG/1
400 TABLET ORAL EVERY 6 HOURS PRN
Status: DISCONTINUED | OUTPATIENT
Start: 2019-10-03 | End: 2019-10-03

## 2019-10-03 RX ORDER — ALUMINA, MAGNESIA, AND SIMETHICONE 2400; 2400; 240 MG/30ML; MG/30ML; MG/30ML
15 SUSPENSION ORAL EVERY 6 HOURS PRN
Status: DISCONTINUED | OUTPATIENT
Start: 2019-10-03 | End: 2019-10-07 | Stop reason: HOSPADM

## 2019-10-03 RX ORDER — OMEPRAZOLE 20 MG/1
20 CAPSULE, DELAYED RELEASE ORAL DAILY
COMMUNITY

## 2019-10-03 RX ORDER — MELOXICAM 7.5 MG/1
15 TABLET ORAL NIGHTLY
Status: DISCONTINUED | OUTPATIENT
Start: 2019-10-03 | End: 2019-10-07 | Stop reason: HOSPADM

## 2019-10-03 RX ORDER — ECHINACEA PURPUREA EXTRACT 125 MG
2 TABLET ORAL AS NEEDED
Status: DISCONTINUED | OUTPATIENT
Start: 2019-10-03 | End: 2019-10-07 | Stop reason: HOSPADM

## 2019-10-03 RX ORDER — BACLOFEN 10 MG/1
10 TABLET ORAL 2 TIMES DAILY
Status: DISCONTINUED | OUTPATIENT
Start: 2019-10-03 | End: 2019-10-07 | Stop reason: HOSPADM

## 2019-10-03 RX ORDER — MELOXICAM 15 MG/1
15 TABLET ORAL NIGHTLY
COMMUNITY

## 2019-10-03 RX ORDER — BENZONATATE 100 MG/1
100 CAPSULE ORAL 3 TIMES DAILY PRN
Status: DISCONTINUED | OUTPATIENT
Start: 2019-10-03 | End: 2019-10-07 | Stop reason: HOSPADM

## 2019-10-03 RX ORDER — BENZTROPINE MESYLATE 1 MG/1
2 TABLET ORAL ONCE AS NEEDED
Status: DISCONTINUED | OUTPATIENT
Start: 2019-10-03 | End: 2019-10-07 | Stop reason: HOSPADM

## 2019-10-03 RX ORDER — HYDROXYZINE 50 MG/1
50 TABLET, FILM COATED ORAL NIGHTLY
Status: CANCELLED | OUTPATIENT
Start: 2019-10-03

## 2019-10-03 RX ORDER — FAMOTIDINE 20 MG/1
20 TABLET, FILM COATED ORAL 2 TIMES DAILY PRN
Status: DISCONTINUED | OUTPATIENT
Start: 2019-10-03 | End: 2019-10-07 | Stop reason: HOSPADM

## 2019-10-03 RX ORDER — ONDANSETRON 4 MG/1
4 TABLET, FILM COATED ORAL EVERY 6 HOURS PRN
Status: DISCONTINUED | OUTPATIENT
Start: 2019-10-03 | End: 2019-10-07 | Stop reason: HOSPADM

## 2019-10-03 RX ORDER — ACETAMINOPHEN 325 MG/1
650 TABLET ORAL EVERY 6 HOURS PRN
Status: DISCONTINUED | OUTPATIENT
Start: 2019-10-03 | End: 2019-10-03

## 2019-10-03 RX ORDER — ALBUTEROL SULFATE 90 UG/1
2 AEROSOL, METERED RESPIRATORY (INHALATION) EVERY 4 HOURS PRN
Status: DISCONTINUED | OUTPATIENT
Start: 2019-10-03 | End: 2019-10-07 | Stop reason: HOSPADM

## 2019-10-03 RX ORDER — BACLOFEN 10 MG/1
10 TABLET ORAL 2 TIMES DAILY
COMMUNITY

## 2019-10-03 RX ORDER — PANTOPRAZOLE SODIUM 40 MG/1
40 TABLET, DELAYED RELEASE ORAL DAILY
Status: DISCONTINUED | OUTPATIENT
Start: 2019-10-03 | End: 2019-10-07 | Stop reason: HOSPADM

## 2019-10-03 RX ADMIN — PANTOPRAZOLE SODIUM 40 MG: 40 TABLET, DELAYED RELEASE ORAL at 17:57

## 2019-10-03 RX ADMIN — MELOXICAM 15 MG: 7.5 TABLET ORAL at 20:46

## 2019-10-03 RX ADMIN — NICOTINE 1 PATCH: 21 PATCH TRANSDERMAL at 17:57

## 2019-10-03 RX ADMIN — HYDROXYZINE HYDROCHLORIDE 50 MG: 50 TABLET ORAL at 20:46

## 2019-10-03 RX ADMIN — BACLOFEN 10 MG: 10 TABLET ORAL at 20:46
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
## 2019-10-04 LAB
HAV IGM SERPL QL IA: NORMAL
HBV CORE IGM SERPL QL IA: NORMAL
HBV SURFACE AG SERPL QL IA: NORMAL
HCV AB SER DONR QL: NORMAL

## 2019-10-04 PROCEDURE — 25010000002 INFLUENZA VAC SUBUNIT QUAD 0.5 ML SUSPENSION PREFILLED SYRINGE: Performed by: PSYCHIATRY & NEUROLOGY

## 2019-10-04 PROCEDURE — 90674 CCIIV4 VAC NO PRSV 0.5 ML IM: CPT | Performed by: PSYCHIATRY & NEUROLOGY

## 2019-10-04 PROCEDURE — 80074 ACUTE HEPATITIS PANEL: CPT | Performed by: PSYCHIATRY & NEUROLOGY

## 2019-10-04 PROCEDURE — G0008 ADMIN INFLUENZA VIRUS VAC: HCPCS | Performed by: PSYCHIATRY & NEUROLOGY

## 2019-10-04 RX ORDER — RISPERIDONE 0.25 MG/1
0.5 TABLET ORAL EVERY 12 HOURS SCHEDULED
Status: DISCONTINUED | OUTPATIENT
Start: 2019-10-04 | End: 2019-10-07 | Stop reason: HOSPADM

## 2019-10-04 RX ORDER — THIAMINE MONONITRATE (VIT B1) 100 MG
100 TABLET ORAL DAILY
Status: DISCONTINUED | OUTPATIENT
Start: 2019-10-04 | End: 2019-10-07 | Stop reason: HOSPADM

## 2019-10-04 RX ADMIN — MELOXICAM 15 MG: 7.5 TABLET ORAL at 21:08

## 2019-10-04 RX ADMIN — BACLOFEN 10 MG: 10 TABLET ORAL at 21:08

## 2019-10-04 RX ADMIN — INFLUENZA A VIRUS A/SINGAPORE/GP1908/2015 IVR-180 (H1N1) ANTIGEN (MDCK CELL DERIVED, PROPIOLACTONE INACTIVATED), INFLUENZA A VIRUS A/NORTH CAROLINA/04/2016 (H3N2) HEMAGGLUTININ ANTIGEN (MDCK CELL DERIVED, PROPIOLACTONE INACTIVATED), INFLUENZA B VIRUS B/IOWA/06/2017 HEMAGGLUTININ ANTIGEN (MDCK CELL DERIVED, PROPIOLACTONE INACTIVATED), INFLUENZA B VIRUS B/SINGAPORE/INFTT-16-0610/2016 HEMAGGLUTININ ANTIGEN (MDCK CELL DERIVED, PROPIOLACTONE INACTIVATED) 0.5 ML: 15; 15; 15; 15 INJECTION, SUSPENSION INTRAMUSCULAR at 09:43

## 2019-10-04 RX ADMIN — PANTOPRAZOLE SODIUM 40 MG: 40 TABLET, DELAYED RELEASE ORAL at 09:43

## 2019-10-04 RX ADMIN — RISPERIDONE 0.5 MG: 0.25 TABLET, FILM COATED ORAL at 21:08

## 2019-10-04 RX ADMIN — NICOTINE 1 PATCH: 21 PATCH TRANSDERMAL at 09:45

## 2019-10-04 RX ADMIN — HYDROXYZINE HYDROCHLORIDE 50 MG: 50 TABLET ORAL at 21:09

## 2019-10-04 RX ADMIN — BACLOFEN 10 MG: 10 TABLET ORAL at 09:43

## 2019-10-04 RX ADMIN — SERTRALINE 50 MG: 50 TABLET, FILM COATED ORAL at 14:27

## 2019-10-04 RX ADMIN — RISPERIDONE 0.5 MG: 0.25 TABLET, FILM COATED ORAL at 14:27

## 2019-10-04 RX ADMIN — Medication 100 MG: at 14:26

## 2019-10-04 NOTE — PROGRESS NOTES
[id number]  Therapist contacted security who agreed to bring belongings so Patient can access phone. Patient to message his boyfriend to inform him of his current location due to this being only means of contact.

## 2019-10-04 NOTE — PLAN OF CARE
Problem: Patient Care Overview  Goal: Plan of Care Review  Outcome: Ongoing (interventions implemented as appropriate)   10/04/19 7232   Coping/Psychosocial   Plan of Care Reviewed With patient   Coping/Psychosocial   Patient Agreement with Plan of Care agrees   Plan of Care Review   Progress improving   OTHER   Outcome Summary Patient has been out of room for meals. Anxiety 6, depression 9, denies S/I, H/I & A/V/H ideations. Cravings for nicotine.       Problem: Overarching Goals (Adult)  Goal: Adheres to Safety Considerations for Self and Others  Outcome: Ongoing (interventions implemented as appropriate)    Goal: Optimized Coping Skills in Response to Life Stressors  Outcome: Ongoing (interventions implemented as appropriate)    Goal: Develops/Participates in Therapeutic [place] to Support Successful Transition  Outcome: Ongoing (interventions implemented as appropriate)

## 2019-10-04 NOTE — DISCHARGE INSTR - APPOINTMENTS
[facility]   [address] [address] [address]   [phone]      October 17, 2019 at 1:00PM with Santosh Roche.   This is the earliest appointment for this therapist.

## 2019-10-04 NOTE — PLAN OF CARE
Problem: Patient Care Overview  Goal: Plan of Care Review  Outcome: Ongoing (interventions implemented as appropriate)   10/04/19 0259   Coping/Psychosocial   Plan of Care Reviewed With patient   Coping/Psychosocial   Patient Agreement with Plan of Care agrees   Plan of Care Review   Progress no change   OTHER   Outcome Summary New patient; see previous nursing note for details.        Problem: Overarching Goals (Adult)  Goal: Adheres to Safety Considerations for Self and Others  Outcome: Ongoing (interventions implemented as appropriate)    Goal: Optimized Coping Skills in Response to Life Stressors  Outcome: Ongoing (interventions implemented as appropriate)    Goal: Develops/Participates in Therapeutic [place] to Support Successful Transition  Outcome: Ongoing (interventions implemented as appropriate)
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
## 2019-10-05 PROCEDURE — 99232 SBSQ HOSP IP/OBS MODERATE 35: CPT | Performed by: PSYCHIATRY & NEUROLOGY

## 2019-10-05 RX ORDER — ACETAMINOPHEN 325 MG/1
650 TABLET ORAL EVERY 6 HOURS PRN
Status: DISCONTINUED | OUTPATIENT
Start: 2019-10-05 | End: 2019-10-07 | Stop reason: HOSPADM

## 2019-10-05 RX ADMIN — RISPERIDONE 0.5 MG: 0.25 TABLET, FILM COATED ORAL at 21:20

## 2019-10-05 RX ADMIN — RISPERIDONE 0.5 MG: 0.25 TABLET, FILM COATED ORAL at 08:17

## 2019-10-05 RX ADMIN — NICOTINE 1 PATCH: 21 PATCH TRANSDERMAL at 08:17

## 2019-10-05 RX ADMIN — BACLOFEN 10 MG: 10 TABLET ORAL at 21:20

## 2019-10-05 RX ADMIN — BACLOFEN 10 MG: 10 TABLET ORAL at 08:17

## 2019-10-05 RX ADMIN — PANTOPRAZOLE SODIUM 40 MG: 40 TABLET, DELAYED RELEASE ORAL at 08:17

## 2019-10-05 RX ADMIN — SERTRALINE 50 MG: 50 TABLET, FILM COATED ORAL at 08:17

## 2019-10-05 RX ADMIN — Medication 100 MG: at 08:17

## 2019-10-05 RX ADMIN — MELOXICAM 15 MG: 7.5 TABLET ORAL at 21:20

## 2019-10-05 NOTE — PLAN OF CARE
Problem: Patient Care Overview  Goal: Plan of Care Review  Outcome: Ongoing (interventions implemented as appropriate)   10/05/19 9245   Coping/Psychosocial   Plan of Care Reviewed With patient   Coping/Psychosocial   Patient Agreement with Plan of Care agrees   Plan of Care Review   Progress improving   OTHER   Outcome Summary Patient out of room most of the day, laughing and talking with other patients on unit, watching TV shows. Slept good last night, anxiety 7, depression 8, denies S/I, H/I & A/V/H ideations. Craving cigarettes.       Problem: Overarching Goals (Adult)  Goal: Adheres to Safety Considerations for Self and Others  Outcome: Ongoing (interventions implemented as appropriate)    Goal: Optimized Coping Skills in Response to Life Stressors  Outcome: Ongoing (interventions implemented as appropriate)    Goal: Develops/Participates in Therapeutic [place] to Support Successful Transition  Outcome: Ongoing (interventions implemented as appropriate)

## 2019-10-05 NOTE — PROGRESS NOTES
Subjective   John Chen is a 29 y.o. male who presents today for hospital follow up    Chief Complaint: Suicidal ideation    History of Present Illness: Patient is a 29-year-old  male who presented with suicidal ideation and reported auditory hallucinations of voices saying negative things to him such as he is worthless and he should kill himself.  Patient was admitted for crisis stabilization.  Today he reports that his mood has improved.  He states that he is doing relatively well overall and denies any thoughts of harming himself or others.  He denies issues with sleep or appetite.  He is having some pain and requests Tylenol or ibuprofen.  We will start Tylenol as he has a reported history of allergy to aspirin.    The following portions of the patient's history were reviewed and updated as appropriate: allergies, current medications, past family history, past medical history, past social history, past surgical history and problem list.      Past Medical History:  Past Medical History:   Diagnosis Date   • Anxiety    • Arthritis    • Asthma    • Chronic pain disorder    • Environmental allergies    • Extremity pain    • GERD (gastroesophageal reflux disease)    • Joint pain    • Low back pain    • MDD (major depressive disorder)    • Self-injurious behavior     CUTTING ON THIGH 3 WEEKS AGO- EARLY SEPTEMBER 2019   • Suicide attempt (CMS/[facility])     DRINKING AND PILLS TOGETHER ABOUT 3 WEEKS AGO       Social History:  Social History     Socioeconomic History   • Marital status: Single     Spouse name: Not on file   • Number of children: Not on file   • Years of education: Not on file   • Highest education level: Not on file   Tobacco Use   • Smoking status: Current Every Day Smoker     Packs/day: 0.50     Years: 15.00     Pack years: 7.50     Types: Cigarettes   • Smokeless tobacco: Never Used   • Tobacco comment: tobacco use since 14 years old   Substance and Sexual Activity   • Alcohol use: Yes   • Drug  "use: Yes     Types: Cocaine, Hydrocodone, Marijuana, Psilocybin, \"Crack\" cocaine   • Sexual activity: Yes     Partners: Male       Family History:  Family History   Problem Relation Age of Onset   • Anxiety disorder Mother    • Depression Mother    • Post-traumatic stress disorder Mother    • Hypertension Father    • Post-traumatic stress disorder Father        Past Surgical History:  Past Surgical History:   Procedure Laterality Date   • CAUTERIZE INNER NOSE     • MYRINGOTOMY W/ TUBES     • TONSILECTOMY, ADENOIDECTOMY, BILATERAL MYRINGOTOMY AND TUBES     • TONSILLECTOMY AND ADENOIDECTOMY         Problem List:  Patient Active Problem List   Diagnosis   • Spondylosis of lumbar region without myelopathy or radiculopathy   • Displacement of lumbar intervertebral disc   • Morbid obesity due to excess calories (CMS/HCC)   • Cigarette nicotine dependence without complication   • Spinal stenosis of lumbar region   • Lumbar discogenic pain syndrome       Allergy:   Allergies   Allergen Reactions   • Aspirin Swelling     Swelling itchy rash over entire body   • Shellfish-Derived Products Itching     Itching rash redness after eating excessive amounts of shrimp        Current Medications:   Current Facility-Administered Medications   Medication Dose Route Frequency Provider Last Rate Last Dose   • acetaminophen (TYLENOL) tablet 650 mg  650 mg Oral Q6H PRN Tj Hernandez MD       • albuterol sulfate HFA (PROVENTIL HFA;VENTOLIN HFA;PROAIR HFA) inhaler 2 puff  2 puff Inhalation Q4H PRN Tj Hernandez MD       • aluminum-magnesium hydroxide-simethicone (MAALOX MAX) 400-400-40 MG/5ML suspension 15 mL  15 mL Oral Q6H PRN Tj Hernandez MD       • baclofen (LIORESAL) tablet 10 mg  10 mg Oral BID Tj Hernandez MD   10 mg at 10/05/19 0817   • benzonatate (TESSALON) capsule 100 mg  100 mg Oral TID PRN Tj Hernandez MD       • benztropine (COGENTIN) tablet 2 mg  2 mg Oral Once PRN Tj Hernandez MD        Or   • " benztropine (COGENTIN) injection 1 mg  1 mg Intramuscular Once PRN Tj Hernandez MD       • famotidine (PEPCID) tablet 20 mg  20 mg Oral BID PRN Tj Hernandez MD       • hydrOXYzine (ATARAX) tablet 50 mg  50 mg Oral Q6H PRN Tj Hernandez MD   50 mg at 10/04/19 2109   • loperamide (IMODIUM) capsule 2 mg  2 mg Oral Q2H PRN Tj Hernandez MD       • magnesium hydroxide (MILK OF MAGNESIA) suspension 2400 mg/10mL 10 mL  10 mL Oral Daily PRN Tj Hernandez MD       • meloxicam (MOBIC) tablet 15 mg  15 mg Oral Nightly Tj Hernandez MD   15 mg at 10/04/19 2108   • nicotine (NICODERM CQ) 21 MG/24HR patch 1 patch  1 patch Transdermal Q24H Tj Hernandez MD   1 patch at 10/05/19 0817   • ondansetron (ZOFRAN) tablet 4 mg  4 mg Oral Q6H PRN Tj Hernandez MD       • pantoprazole (PROTONIX) EC tablet 40 mg  40 mg Oral Daily Tj Hernandez MD   40 mg at 10/05/19 0817   • risperiDONE (risperDAL) tablet 0.5 mg  0.5 mg Oral Q12H Bo Montana MD   0.5 mg at 10/05/19 0817   • sertraline (ZOLOFT) tablet 50 mg  50 mg Oral Daily Bo Montana MD   50 mg at 10/05/19 0817   • sodium chloride nasal spray 2 spray  2 spray Each Nare PRN Tj Hernandez MD       • thiamine (VITAMIN B-1) tablet 100 mg  100 mg Oral Daily Bo Montana MD   100 mg at 10/05/19 0817   • traZODone (DESYREL) tablet 50 mg  50 mg Oral Nightly PRN Tj Hernandez MD           Review of Symptoms:    Review of Systems   Constitutional: Negative.    HENT: Negative.    Eyes: Negative.    Respiratory: Negative.    Cardiovascular: Negative.    Gastrointestinal: Negative.    Endocrine: Negative.    Genitourinary: Negative.    Musculoskeletal: Negative.    Skin: Negative.    Allergic/Immunologic: Negative.    Neurological: Negative.    Hematological: Negative.    Psychiatric/Behavioral: Negative for agitation, behavioral problems, decreased concentration, dysphoric mood, hallucinations, self-injury, sleep  "disturbance, suicidal ideas, negative for hyperactivity, depressed mood and stress. The patient is not nervous/anxious.          Physical Exam:   Blood pressure 155/89, pulse 93, temperature 97.1 °F (36.2 °C), temperature source Temporal, resp. rate 18, height 177.8 cm (70\"), weight (!) 137 kg (302 lb), SpO2 96 %.    Appearance: Overweight  male of stated age in no acute distress  Gait, Station, Strength: Within normal limits    Mental Status Exam:   Hygiene:   good  Cooperation:  Cooperative  Eye Contact:  Good  Psychomotor Behavior:  Appropriate  Affect:  Full range  Mood: normal  Hopelessness: Denies  Speech:  Normal  Thought Process:  Goal directed and Linear  Thought Content:  Normal  Suicidal:  None  Homicidal:  None  Hallucinations:  None  Delusion:  None  Memory:  Intact  Orientation:  Person, Place, Time and Situation  Reliability:  good  Insight:  Fair  Judgement:  Fair  Impulse Control:  Good  Physical/Medical Issues:  No        Lab Results:   Admission on 10/03/2019   Component Date Value Ref Range Status   • Hepatitis B Surface Ag 10/04/2019 Non-Reactive  Non-Reactive Final   • Hep A IgM 10/04/2019 Non-Reactive  Non-Reactive Final   • Hep B C IgM 10/04/2019 Non-Reactive  Non-Reactive Final   • Hepatitis C Ab 10/04/2019 Non-Reactive  Non-Reactive Final   Admission on 10/03/2019, Discharged on 10/03/2019   Component Date Value Ref Range Status   • Glucose 10/03/2019 133* 65 - 99 mg/dL Final   • BUN 10/03/2019 12  6 - 20 mg/dL Final   • Creatinine 10/03/2019 0.96  0.76 - 1.27 mg/dL Final   • Sodium 10/03/2019 144  136 - 145 mmol/L Final   • Potassium 10/03/2019 4.3  3.5 - 5.2 mmol/L Final   • Chloride 10/03/2019 106  98 - 107 mmol/L Final   • CO2 10/03/2019 21.6* 22.0 - 29.0 mmol/L Final   • Calcium 10/03/2019 9.0  8.6 - 10.5 mg/dL Final   • Total Protein 10/03/2019 8.0  6.0 - 8.5 g/dL Final   • Albumin 10/03/2019 4.15  3.50 - 5.20 g/dL Final   • ALT (SGPT) 10/03/2019 47* 1 - 41 U/L Final   • AST " (SGOT) 10/03/2019 29  1 - 40 U/L Final    Specimen hemolyzed.  Results may be affected.   • Alkaline Phosphatase 10/03/2019 95  39 - 117 U/L Final   • Total Bilirubin 10/03/2019 <0.2* 0.2 - 1.2 mg/dL Final   • eGFR Non African Amer 10/03/2019 93  >60 mL/min/1.73 Final   • Globulin 10/03/2019 3.9  gm/dL Final   • A/G Ratio 10/03/2019 1.1  g/dL Final   • BUN/Creatinine Ratio 10/03/2019 12.5  7.0 - 25.0 Final   • Anion Gap 10/03/2019 16.4* 5.0 - 15.0 mmol/L Final   • Color, UA 10/03/2019 Yellow  Yellow, Straw Final   • Appearance, UA 10/03/2019 Clear  Clear Final   • pH, UA 10/03/2019 6.5  5.0 - 8.0 Final   • Specific Gravity, UA 10/03/2019 1.020  1.005 - 1.030 Final   • Glucose, UA 10/03/2019 Negative  Negative Final   • Ketones, UA 10/03/2019 Negative  Negative Final   • Bilirubin, UA 10/03/2019 Negative  Negative Final   • Blood, UA 10/03/2019 Negative  Negative Final   • Protein, UA 10/03/2019 Negative  Negative Final   • Leuk Esterase, UA 10/03/2019 Negative  Negative Final   • Nitrite, UA 10/03/2019 Negative  Negative Final   • Urobilinogen, UA 10/03/2019 0.2 E.U./dL  0.2 - 1.0 E.U./dL Final   • Ethanol 10/03/2019 51* 0 - 10 mg/dL Final   • Ethanol % 10/03/2019 0.051  % Final   • Amphetamine Screen, Urine 10/03/2019 Negative  Negative Final   • Barbiturates Screen, Urine 10/03/2019 Negative  Negative Final   • Benzodiazepine Screen, Urine 10/03/2019 Negative  Negative Final   • Cocaine Screen, Urine 10/03/2019 Negative  Negative Final   • Methadone Screen, Urine 10/03/2019 Negative  Negative Final   • Opiate Screen 10/03/2019 Negative  Negative Final   • Phencyclidine (PCP), Urine 10/03/2019 Negative  Negative Final   • THC, Screen, Urine 10/03/2019 Negative  Negative Final   • 6-ACETYL MORPHINE 10/03/2019 Negative  Negative Final   • Buprenorphine, Screen, Urine 10/03/2019 Negative  Negative Final   • Oxycodone Screen, Urine 10/03/2019 Negative  Negative Final   • Magnesium 10/03/2019 2.1  1.6 - 2.6 mg/dL Final    • WBC 10/03/2019 7.31  3.40 - 10.80 10*3/mm3 Final   • RBC 10/03/2019 5.42  4.14 - 5.80 10*6/mm3 Final   • Hemoglobin 10/03/2019 16.0  13.0 - 17.7 g/dL Final   • Hematocrit 10/03/2019 48.5  37.5 - 51.0 % Final   • MCV 10/03/2019 89.5  79.0 - 97.0 fL Final   • MCH 10/03/2019 29.5  26.6 - 33.0 pg Final   • MCHC 10/03/2019 33.0  31.5 - 35.7 g/dL Final   • RDW 10/03/2019 14.3  12.3 - 15.4 % Final   • RDW-SD 10/03/2019 46.5  37.0 - 54.0 fl Final   • MPV 10/03/2019 9.8  6.0 - 12.0 fL Final   • Platelets 10/03/2019 378  140 - 450 10*3/mm3 Final   • Neutrophil % 10/03/2019 57.5  42.7 - 76.0 % Final   • Lymphocyte % 10/03/2019 34.3  19.6 - 45.3 % Final   • Monocyte % 10/03/2019 5.2  5.0 - 12.0 % Final   • Eosinophil % 10/03/2019 2.3  0.3 - 6.2 % Final   • Basophil % 10/03/2019 0.4  0.0 - 1.5 % Final   • Immature Grans % 10/03/2019 0.3  0.0 - 0.5 % Final   • Neutrophils, Absolute 10/03/2019 4.20  1.70 - 7.00 10*3/mm3 Final   • Lymphocytes, Absolute 10/03/2019 2.51  0.70 - 3.10 10*3/mm3 Final   • Monocytes, Absolute 10/03/2019 0.38  0.10 - 0.90 10*3/mm3 Final   • Eosinophils, Absolute 10/03/2019 0.17  0.00 - 0.40 10*3/mm3 Final   • Basophils, Absolute 10/03/2019 0.03  0.00 - 0.20 10*3/mm3 Final   • Immature Grans, Absolute 10/03/2019 0.02  0.00 - 0.05 10*3/mm3 Final       Assessment/Plan     Major depressive disorder, recurrent, severe  -Patient reports auditory hallucinations but they appear to be more of an internal monologue based on his recent breakup with fiancé  -Continue Zoloft 50 mg p.o. Daily  -Continue Risperdal 0.5 mg twice daily    Alcohol use disorder, dependence  -Monitor for withdrawal symptoms  -Encourage cessation  -Supplement with thiamine 100 mg p.o. Daily    Cocaine abuse  -Encourage cessation  -Patient declined referral to long-term rehab    Pain  -Tylenol as needed      MEDS ORDERED DURING VISIT:  New Medications Ordered This Visit   Medications   • traZODone (DESYREL) tablet 50 mg   • hydrOXYzine  (ATARAX) tablet 50 mg   • magnesium hydroxide (MILK OF MAGNESIA) suspension 2400 mg/10mL 10 mL   • loperamide (IMODIUM) capsule 2 mg   • aluminum-magnesium hydroxide-simethicone (MAALOX MAX) 400-400-40 MG/5ML suspension 15 mL   • famotidine (PEPCID) tablet 20 mg   • OR Linked Order Group    • benztropine (COGENTIN) tablet 2 mg    • benztropine (COGENTIN) injection 1 mg   • benzonatate (TESSALON) capsule 100 mg   • sodium chloride nasal spray 2 spray   • ondansetron (ZOFRAN) tablet 4 mg   • nicotine (NICODERM CQ) 21 MG/24HR patch 1 patch   • albuterol sulfate HFA (PROVENTIL HFA;VENTOLIN HFA;PROAIR HFA) inhaler 2 puff   • baclofen (LIORESAL) tablet 10 mg   • meloxicam (MOBIC) tablet 15 mg   • pantoprazole (PROTONIX) EC tablet 40 mg   • Influenza Vac Subunit Quad (FLUCELVAX) injection 0.5 mL   • sertraline (ZOLOFT) tablet 50 mg   • risperiDONE (risperDAL) tablet 0.5 mg   • thiamine (VITAMIN B-1) tablet 100 mg   • acetaminophen (TYLENOL) tablet 650 mg                This document has been electronically signed by jT Hernandez MD  October 5, 2019 11:40 AM

## 2019-10-05 NOTE — PLAN OF CARE
"Problem: Patient Care Overview  Goal: Plan of Care Review  Outcome: Ongoing (interventions implemented as appropriate)  Patient reports good appetite and sleep; rate anxiety 7; depression 10; hopeless, helpless, worthless, reports suicidal thoughts, no plan; contract safety; auditory hallucinations, \"kill self\"; \"don't trust anyone\"; paranoid; denies cravings, denies withdrawal symptoms; c/o lower back pain 8/10; healing cuts to bilateral upper anterior thighs, CIWA 5; pt talkative; cooperative; loud and laughing at times; interacting well with staff and peers; will continue to monitor.   10/05/19 0123   Coping/Psychosocial   Plan of Care Reviewed With patient   Coping/Psychosocial   Patient Agreement with Plan of Care agrees   Plan of Care Review   Progress improving     Goal: Individualization and Mutuality  Outcome: Ongoing (interventions implemented as appropriate)    Goal: Discharge Needs Assessment  Outcome: Ongoing (interventions implemented as appropriate)    Goal: Interprofessional Rounds/Family Conf  Outcome: Ongoing (interventions implemented as appropriate)      Problem: Overarching Goals (Adult)  Goal: Adheres to Safety Considerations for Self and Others  Outcome: Ongoing (interventions implemented as appropriate)    Goal: Optimized Coping Skills in Response to Life Stressors  Outcome: Ongoing (interventions implemented as appropriate)    Goal: Develops/Participates in Therapeutic [place] to Support Successful Transition  Outcome: Ongoing (interventions implemented as appropriate)        "

## 2019-10-06 PROCEDURE — 99231 SBSQ HOSP IP/OBS SF/LOW 25: CPT | Performed by: PSYCHIATRY & NEUROLOGY

## 2019-10-06 RX ADMIN — MELOXICAM 15 MG: 7.5 TABLET ORAL at 20:38

## 2019-10-06 RX ADMIN — BACLOFEN 10 MG: 10 TABLET ORAL at 08:37

## 2019-10-06 RX ADMIN — RISPERIDONE 0.5 MG: 0.25 TABLET, FILM COATED ORAL at 08:36

## 2019-10-06 RX ADMIN — BACLOFEN 10 MG: 10 TABLET ORAL at 20:38

## 2019-10-06 RX ADMIN — Medication 100 MG: at 08:36

## 2019-10-06 RX ADMIN — SERTRALINE 50 MG: 50 TABLET, FILM COATED ORAL at 08:36

## 2019-10-06 RX ADMIN — HYDROXYZINE HYDROCHLORIDE 50 MG: 50 TABLET ORAL at 20:38

## 2019-10-06 RX ADMIN — PANTOPRAZOLE SODIUM 40 MG: 40 TABLET, DELAYED RELEASE ORAL at 08:36

## 2019-10-06 RX ADMIN — RISPERIDONE 0.5 MG: 0.25 TABLET, FILM COATED ORAL at 20:38

## 2019-10-06 NOTE — PROGRESS NOTES
Subjective   John Chen is a 29 y.o. male who presents today for hospital follow up    Chief Complaint: Suicidal ideation    History of Present Illness: Patient is a 29-year-old  male who presented with suicidal ideation and reported auditory hallucinations of voices saying negative things to him such as he is worthless and he should kill himself.  Patient was admitted for crisis stabilization.      Today he was evaluated in the day room.  He states that he had some nightmares last night which caused him to have slightly more depression and dysphoric mood today.  He states that he slept through the night but the nightmares interrupted his sleep.  He denies thoughts of harming himself or others.    The following portions of the patient's history were reviewed and updated as appropriate: allergies, current medications, past family history, past medical history, past social history, past surgical history and problem list.      Past Medical History:  Past Medical History:   Diagnosis Date   • Anxiety    • Arthritis    • Asthma    • Chronic pain disorder    • Environmental allergies    • Extremity pain    • GERD (gastroesophageal reflux disease)    • Joint pain    • Low back pain    • MDD (major depressive disorder)    • Self-injurious behavior     CUTTING ON THIGH 3 WEEKS AGO- EARLY SEPTEMBER 2019   • Suicide attempt (CMS/formerly [facility])     DRINKING AND PILLS TOGETHER ABOUT 3 WEEKS AGO       Social History:  Social History     Socioeconomic History   • Marital status: Single     Spouse name: Not on file   • Number of children: Not on file   • Years of education: Not on file   • Highest education level: Not on file   Tobacco Use   • Smoking status: Current Every Day Smoker     Packs/day: 0.50     Years: 15.00     Pack years: 7.50     Types: Cigarettes   • Smokeless tobacco: Never Used   • Tobacco comment: tobacco use since 14 years old   Substance and Sexual Activity   • Alcohol use: Yes   • Drug use: Yes     Types:  "Cocaine, Hydrocodone, Marijuana, Psilocybin, \"Crack\" cocaine   • Sexual activity: Yes     Partners: Male       Family History:  Family History   Problem Relation Age of Onset   • Anxiety disorder Mother    • Depression Mother    • Post-traumatic stress disorder Mother    • Hypertension Father    • Post-traumatic stress disorder Father        Past Surgical History:  Past Surgical History:   Procedure Laterality Date   • CAUTERIZE INNER NOSE     • MYRINGOTOMY W/ TUBES     • TONSILECTOMY, ADENOIDECTOMY, BILATERAL MYRINGOTOMY AND TUBES     • TONSILLECTOMY AND ADENOIDECTOMY         Problem List:  Patient Active Problem List   Diagnosis   • Spondylosis of lumbar region without myelopathy or radiculopathy   • Displacement of lumbar intervertebral disc   • Morbid obesity due to excess calories (CMS/HCC)   • Cigarette nicotine dependence without complication   • Spinal stenosis of lumbar region   • Lumbar discogenic pain syndrome       Allergy:   Allergies   Allergen Reactions   • Aspirin Swelling     Swelling itchy rash over entire body   • Shellfish-Derived Products Itching     Itching rash redness after eating excessive amounts of shrimp        Current Medications:   Current Facility-Administered Medications   Medication Dose Route Frequency Provider Last Rate Last Dose   • acetaminophen (TYLENOL) tablet 650 mg  650 mg Oral Q6H PRN Tj Hernandez MD       • albuterol sulfate HFA (PROVENTIL HFA;VENTOLIN HFA;PROAIR HFA) inhaler 2 puff  2 puff Inhalation Q4H PRN Tj Hernandez MD       • aluminum-magnesium hydroxide-simethicone (MAALOX MAX) 400-400-40 MG/5ML suspension 15 mL  15 mL Oral Q6H PRN Tj Hernandez MD       • baclofen (LIORESAL) tablet 10 mg  10 mg Oral BID Tj Hernandez MD   10 mg at 10/06/19 0837   • benzonatate (TESSALON) capsule 100 mg  100 mg Oral TID PRN Tj Hernandez MD       • benztropine (COGENTIN) tablet 2 mg  2 mg Oral Once PRN Tj Hernandez MD        Or   • benztropine (COGENTIN) " injection 1 mg  1 mg Intramuscular Once PRN Tj Hernandez MD       • famotidine (PEPCID) tablet 20 mg  20 mg Oral BID PRN Tj Hernandez MD       • hydrOXYzine (ATARAX) tablet 50 mg  50 mg Oral Q6H PRN Tj Hernandez MD   50 mg at 10/04/19 2109   • loperamide (IMODIUM) capsule 2 mg  2 mg Oral Q2H PRN Tj Hernandez MD       • magnesium hydroxide (MILK OF MAGNESIA) suspension 2400 mg/10mL 10 mL  10 mL Oral Daily PRN Tj Hernandez MD       • meloxicam (MOBIC) tablet 15 mg  15 mg Oral Nightly Tj Hernandez MD   15 mg at 10/05/19 2120   • nicotine (NICODERM CQ) 21 MG/24HR patch 1 patch  1 patch Transdermal Q24H Tj Hernandez MD   1 patch at 10/05/19 0817   • ondansetron (ZOFRAN) tablet 4 mg  4 mg Oral Q6H PRN Tj Hernandez MD       • pantoprazole (PROTONIX) EC tablet 40 mg  40 mg Oral Daily Tj Hernandez MD   40 mg at 10/06/19 0836   • risperiDONE (risperDAL) tablet 0.5 mg  0.5 mg Oral Q12H Bo Montana MD   0.5 mg at 10/06/19 0836   • sertraline (ZOLOFT) tablet 50 mg  50 mg Oral Daily Bo Montana MD   50 mg at 10/06/19 0836   • sodium chloride nasal spray 2 spray  2 spray Each Nare PRN Tj Hernandez MD       • thiamine (VITAMIN B-1) tablet 100 mg  100 mg Oral Daily Bo Montana MD   100 mg at 10/06/19 0836   • traZODone (DESYREL) tablet 50 mg  50 mg Oral Nightly PRN Tj Hernandez MD           Review of Symptoms:    Review of Systems   Constitutional: Negative.    HENT: Negative.    Eyes: Negative.    Respiratory: Negative.    Cardiovascular: Negative.    Gastrointestinal: Negative.    Endocrine: Negative.    Genitourinary: Negative.    Musculoskeletal: Negative.    Skin: Negative.    Allergic/Immunologic: Negative.    Neurological: Negative.    Hematological: Negative.    Psychiatric/Behavioral: Positive for dysphoric mood. Negative for agitation, behavioral problems, decreased concentration, hallucinations, self-injury, sleep disturbance, suicidal  "ideas, negative for hyperactivity, depressed mood and stress. The patient is not nervous/anxious.          Physical Exam:   Blood pressure 146/91, pulse 98, temperature 96.9 °F (36.1 °C), temperature source Temporal, resp. rate 18, height 177.8 cm (70\"), weight (!) 137 kg (302 lb), SpO2 97 %.    Appearance: Overweight  male of stated age in no acute distress  Gait, Station, Strength: Within normal limits    Mental Status Exam:   Hygiene:   good  Cooperation:  Cooperative  Eye Contact:  Good  Psychomotor Behavior:  Appropriate  Affect:  Full range  Mood: normal  Hopelessness: Denies  Speech:  Normal  Thought Process:  Goal directed and Linear  Thought Content:  Normal  Suicidal:  None  Homicidal:  None  Hallucinations:  None  Delusion:  None  Memory:  Intact  Orientation:  Person, Place, Time and Situation  Reliability:  good  Insight:  Fair  Judgement:  Fair  Impulse Control:  Good  Physical/Medical Issues:  No        Lab Results:   Admission on 10/03/2019   Component Date Value Ref Range Status   • Hepatitis B Surface Ag 10/04/2019 Non-Reactive  Non-Reactive Final   • Hep A IgM 10/04/2019 Non-Reactive  Non-Reactive Final   • Hep B C IgM 10/04/2019 Non-Reactive  Non-Reactive Final   • Hepatitis C Ab 10/04/2019 Non-Reactive  Non-Reactive Final   Admission on 10/03/2019, Discharged on 10/03/2019   Component Date Value Ref Range Status   • Glucose 10/03/2019 133* 65 - 99 mg/dL Final   • BUN 10/03/2019 12  6 - 20 mg/dL Final   • Creatinine 10/03/2019 0.96  0.76 - 1.27 mg/dL Final   • Sodium 10/03/2019 144  136 - 145 mmol/L Final   • Potassium 10/03/2019 4.3  3.5 - 5.2 mmol/L Final   • Chloride 10/03/2019 106  98 - 107 mmol/L Final   • CO2 10/03/2019 21.6* 22.0 - 29.0 mmol/L Final   • Calcium 10/03/2019 9.0  8.6 - 10.5 mg/dL Final   • Total Protein 10/03/2019 8.0  6.0 - 8.5 g/dL Final   • Albumin 10/03/2019 4.15  3.50 - 5.20 g/dL Final   • ALT (SGPT) 10/03/2019 47* 1 - 41 U/L Final   • AST (SGOT) 10/03/2019 29  " 1 - 40 U/L Final    Specimen hemolyzed.  Results may be affected.   • Alkaline Phosphatase 10/03/2019 95  39 - 117 U/L Final   • Total Bilirubin 10/03/2019 <0.2* 0.2 - 1.2 mg/dL Final   • eGFR Non African Amer 10/03/2019 93  >60 mL/min/1.73 Final   • Globulin 10/03/2019 3.9  gm/dL Final   • A/G Ratio 10/03/2019 1.1  g/dL Final   • BUN/Creatinine Ratio 10/03/2019 12.5  7.0 - 25.0 Final   • Anion Gap 10/03/2019 16.4* 5.0 - 15.0 mmol/L Final   • Color, UA 10/03/2019 Yellow  Yellow, Straw Final   • Appearance, UA 10/03/2019 Clear  Clear Final   • pH, UA 10/03/2019 6.5  5.0 - 8.0 Final   • Specific Gravity, UA 10/03/2019 1.020  1.005 - 1.030 Final   • Glucose, UA 10/03/2019 Negative  Negative Final   • Ketones, UA 10/03/2019 Negative  Negative Final   • Bilirubin, UA 10/03/2019 Negative  Negative Final   • Blood, UA 10/03/2019 Negative  Negative Final   • Protein, UA 10/03/2019 Negative  Negative Final   • Leuk Esterase, UA 10/03/2019 Negative  Negative Final   • Nitrite, UA 10/03/2019 Negative  Negative Final   • Urobilinogen, UA 10/03/2019 0.2 E.U./dL  0.2 - 1.0 E.U./dL Final   • Ethanol 10/03/2019 51* 0 - 10 mg/dL Final   • Ethanol % 10/03/2019 0.051  % Final   • Amphetamine Screen, Urine 10/03/2019 Negative  Negative Final   • Barbiturates Screen, Urine 10/03/2019 Negative  Negative Final   • Benzodiazepine Screen, Urine 10/03/2019 Negative  Negative Final   • Cocaine Screen, Urine 10/03/2019 Negative  Negative Final   • Methadone Screen, Urine 10/03/2019 Negative  Negative Final   • Opiate Screen 10/03/2019 Negative  Negative Final   • Phencyclidine (PCP), Urine 10/03/2019 Negative  Negative Final   • THC, Screen, Urine 10/03/2019 Negative  Negative Final   • 6-ACETYL MORPHINE 10/03/2019 Negative  Negative Final   • Buprenorphine, Screen, Urine 10/03/2019 Negative  Negative Final   • Oxycodone Screen, Urine 10/03/2019 Negative  Negative Final   • Magnesium 10/03/2019 2.1  1.6 - 2.6 mg/dL Final   • WBC 10/03/2019  7.31  3.40 - 10.80 10*3/mm3 Final   • RBC 10/03/2019 5.42  4.14 - 5.80 10*6/mm3 Final   • Hemoglobin 10/03/2019 16.0  13.0 - 17.7 g/dL Final   • Hematocrit 10/03/2019 48.5  37.5 - 51.0 % Final   • MCV 10/03/2019 89.5  79.0 - 97.0 fL Final   • MCH 10/03/2019 29.5  26.6 - 33.0 pg Final   • MCHC 10/03/2019 33.0  31.5 - 35.7 g/dL Final   • RDW 10/03/2019 14.3  12.3 - 15.4 % Final   • RDW-SD 10/03/2019 46.5  37.0 - 54.0 fl Final   • MPV 10/03/2019 9.8  6.0 - 12.0 fL Final   • Platelets 10/03/2019 378  140 - 450 10*3/mm3 Final   • Neutrophil % 10/03/2019 57.5  42.7 - 76.0 % Final   • Lymphocyte % 10/03/2019 34.3  19.6 - 45.3 % Final   • Monocyte % 10/03/2019 5.2  5.0 - 12.0 % Final   • Eosinophil % 10/03/2019 2.3  0.3 - 6.2 % Final   • Basophil % 10/03/2019 0.4  0.0 - 1.5 % Final   • Immature Grans % 10/03/2019 0.3  0.0 - 0.5 % Final   • Neutrophils, Absolute 10/03/2019 4.20  1.70 - 7.00 10*3/mm3 Final   • Lymphocytes, Absolute 10/03/2019 2.51  0.70 - 3.10 10*3/mm3 Final   • Monocytes, Absolute 10/03/2019 0.38  0.10 - 0.90 10*3/mm3 Final   • Eosinophils, Absolute 10/03/2019 0.17  0.00 - 0.40 10*3/mm3 Final   • Basophils, Absolute 10/03/2019 0.03  0.00 - 0.20 10*3/mm3 Final   • Immature Grans, Absolute 10/03/2019 0.02  0.00 - 0.05 10*3/mm3 Final       Assessment/Plan     Major depressive disorder, recurrent, severe  -Patient reports auditory hallucinations but they appear to be more of an internal monologue based on his recent breakup with fiancé  -Continue Zoloft 50 mg p.o. Daily  -Continue Risperdal 0.5 mg twice daily    Alcohol use disorder, dependence  -Monitor for withdrawal symptoms  -Encourage cessation  -Supplement with thiamine 100 mg p.o. Daily    Cocaine abuse  -Encourage cessation  -Patient declined referral to long-term rehab    Pain  -Tylenol as needed      MEDS ORDERED DURING VISIT:  New Medications Ordered This Visit   Medications   • traZODone (DESYREL) tablet 50 mg   • hydrOXYzine (ATARAX) tablet 50 mg    • magnesium hydroxide (MILK OF MAGNESIA) suspension 2400 mg/10mL 10 mL   • loperamide (IMODIUM) capsule 2 mg   • aluminum-magnesium hydroxide-simethicone (MAALOX MAX) 400-400-40 MG/5ML suspension 15 mL   • famotidine (PEPCID) tablet 20 mg   • OR Linked Order Group    • benztropine (COGENTIN) tablet 2 mg    • benztropine (COGENTIN) injection 1 mg   • benzonatate (TESSALON) capsule 100 mg   • sodium chloride nasal spray 2 spray   • ondansetron (ZOFRAN) tablet 4 mg   • nicotine (NICODERM CQ) 21 MG/24HR patch 1 patch   • albuterol sulfate HFA (PROVENTIL HFA;VENTOLIN HFA;PROAIR HFA) inhaler 2 puff   • baclofen (LIORESAL) tablet 10 mg   • meloxicam (MOBIC) tablet 15 mg   • pantoprazole (PROTONIX) EC tablet 40 mg   • Influenza Vac Subunit Quad (FLUCELVAX) injection 0.5 mL   • sertraline (ZOLOFT) tablet 50 mg   • risperiDONE (risperDAL) tablet 0.5 mg   • thiamine (VITAMIN B-1) tablet 100 mg   • acetaminophen (TYLENOL) tablet 650 mg                This document has been electronically signed by Tj Hernandez MD  October 6, 2019 11:10 AM

## 2019-10-06 NOTE — PLAN OF CARE
"Problem: Patient Care Overview  Goal: Plan of Care Review  Outcome: Ongoing (interventions implemented as appropriate)  Patient reports good appetite; frequent awakening with sleep; rates anxiety and depression 8; hopeless, helpless, worthless; reports suicidal thoughts, no plan; pt contracted safety; reports auditory hallucination, \"voices telling me to hurt myself\"; alert and oriented; denies cravings and w/d symptoms; CIWA 5; pt rates pain 8 all over generalized aches; pt laughing and talking with peers during free time and snack, watching TV; calm and cooperative; no acute distress noted; will continue to monitor.   10/05/19 9844   Coping/Psychosocial   Plan of Care Reviewed With patient   Coping/Psychosocial   Patient Agreement with Plan of Care agrees   Plan of Care Review   Progress improving     Goal: Individualization and Mutuality  Outcome: Ongoing (interventions implemented as appropriate)    Goal: Discharge Needs Assessment  Outcome: Ongoing (interventions implemented as appropriate)    Goal: Interprofessional Rounds/Family Conf  Outcome: Ongoing (interventions implemented as appropriate)      Problem: Overarching Goals (Adult)  Goal: Adheres to Safety Considerations for Self and Others  Outcome: Ongoing (interventions implemented as appropriate)    Goal: Optimized Coping Skills in Response to Life Stressors  Outcome: Ongoing (interventions implemented as appropriate)    Goal: Develops/Participates in Therapeutic [place] to Support Successful Transition  Outcome: Ongoing (interventions implemented as appropriate)        "

## 2019-10-06 NOTE — PLAN OF CARE
Problem: Patient Care Overview  Goal: Plan of Care Review  Outcome: Ongoing (interventions implemented as appropriate)  Pt calm and cooperative. Pt interacting well with staff and peers. Pt reports poor sleep but a good appetite. Rates A/D 6/10. Denies SI/HI or hallucinations.    10/06/19 9784   Coping/Psychosocial   Plan of Care Reviewed With patient   Coping/Psychosocial   Patient Agreement with Plan of Care agrees   Plan of Care Review   Progress improving     Goal: Individualization and Mutuality  Outcome: Ongoing (interventions implemented as appropriate)    Goal: Discharge Needs Assessment  Outcome: Ongoing (interventions implemented as appropriate)    Goal: Interprofessional Rounds/Family Conf  Outcome: Ongoing (interventions implemented as appropriate)      Problem: Overarching Goals (Adult)  Goal: Adheres to Safety Considerations for Self and Others  Outcome: Ongoing (interventions implemented as appropriate)    Goal: Optimized Coping Skills in Response to Life Stressors  Outcome: Ongoing (interventions implemented as appropriate)    Goal: Develops/Participates in Therapeutic [place] to Support Successful Transition  Outcome: Ongoing (interventions implemented as appropriate)

## 2019-10-07 VITALS
WEIGHT: 302 LBS | OXYGEN SATURATION: 98 % | TEMPERATURE: 98.1 F | DIASTOLIC BLOOD PRESSURE: 78 MMHG | HEIGHT: 70 IN | RESPIRATION RATE: 19 BRPM | BODY MASS INDEX: 43.23 KG/M2 | HEART RATE: 88 BPM | SYSTOLIC BLOOD PRESSURE: 122 MMHG

## 2019-10-07 RX ORDER — HYDROXYZINE 50 MG/1
50 TABLET, FILM COATED ORAL DAILY PRN
Qty: 15 TABLET | Refills: 0 | Status: SHIPPED | OUTPATIENT
Start: 2019-10-07 | End: 2019-10-22

## 2019-10-07 RX ORDER — LANOLIN ALCOHOL/MO/W.PET/CERES
100 CREAM (GRAM) TOPICAL DAILY
Qty: 30 TABLET | Refills: 0 | Status: SHIPPED | OUTPATIENT
Start: 2019-10-08 | End: 2019-11-07

## 2019-10-07 RX ORDER — RISPERIDONE 0.5 MG/1
0.5 TABLET ORAL EVERY 12 HOURS SCHEDULED
Qty: 30 TABLET | Refills: 0 | Status: SHIPPED | OUTPATIENT
Start: 2019-10-07 | End: 2019-10-22

## 2019-10-07 RX ADMIN — PANTOPRAZOLE SODIUM 40 MG: 40 TABLET, DELAYED RELEASE ORAL at 08:08

## 2019-10-07 RX ADMIN — BACLOFEN 10 MG: 10 TABLET ORAL at 08:08

## 2019-10-07 RX ADMIN — RISPERIDONE 0.5 MG: 0.25 TABLET, FILM COATED ORAL at 08:08

## 2019-10-07 RX ADMIN — Medication 100 MG: at 08:08

## 2019-10-07 RX ADMIN — SERTRALINE 50 MG: 50 TABLET, FILM COATED ORAL at 08:08

## 2019-10-07 NOTE — PROGRESS NOTES
Behavioral Health Discharge Summary             Please fax within 24 hours of discharge to [facility] at: [phone]      Member Name: John Chen Member ID: [id number]   Authorization Number: [id number] Phone: [phone]   Member Address: [address] [place] [id number]   Discharge Date: 10/07/19  Level of Care at Discharge:    Facility: [facility] Staff Completing Form: Marion MARR RN    If the member is being discharged directly to a residential or extended care program, please specify the type below.   __Private Child-Caring Facility (PCC) Residential/Group Home   __Private Child-Caring Facility (PCC) Therapeutic Foster Care   __Residential Treatment Facility (RTF)   __Psychiatric Residential Treatment Facility (PRTF I or II)   __Long-Term Acute Inpatient Hospital Services or Extended Care Unit (ECU)   __Other (please specify):    Brief discharge summary of treatment received (for follow up by the case management team): D/C clinical with list of medications and follow up appts given to patient upon discharge.     BRIEF SUMMARY OF RECOMMENDATIONS FOR ONGOING TREATMENT     Discharged to where: Home    Discharge diagnoses: F 32.9   Axis I:    Axis II:    Axis III:    Axis IV:    Axis V:    Does the member understand his/her DX?  Yes          Medication     Dose     Schedule Supply/  Quantity  Given at Discharge RX Provided  Yes/No  If Rx Provided, Quantity RX Prior Auth Required  Yes/No Prior Auth  Completed   Vistaril  50 mg  HS        Risperidone  0.5 mg  Every 12 hours         Sertraline  50 mg  daily                                                                    Does the member understand the reason for taking these medications? Yes                                                           FOLLOW-UP APPOINTMENTS   Please schedule within 7 days of discharge and provide appointment details for all referred services.    PCP/Other Providers Involved in  Treatment:    Appointment Type: OP      Provider Name: [facility]  Provider Phone:   [phone] Appointment Date: 10/17/19 Appointment Time:   1:00 PM with Santosh Leos   (new to OP services)        Case Management    Is the member already enrolled in case management?  Yes/No  If yes, date the CM was notified:    If no, was the CM referral offered?  Yes/No  Accepted? Yes/No    Is the Release of Information in the chart? Yes/No:      Medication Management (for member discharged with psychiatric medications):      A&D Treatment (for member with substance abuse/   dependence in the past year):      Medical Condition (for member with a medical condition):    Other recommended treatment:    Do you have any concerns about the discharge plan?  No    If yes, explain:    Was the member involved in the discharge planning?  Yes    If no, explain:    Was a copy of the discharge plan provided to the member?  Yes    If no, explain:

## 2019-10-07 NOTE — PROGRESS NOTES
"[id number]  Spoke with the patient's mother, Karina, who was reachable at [phone]. She reported believing the patient was not yet ready for discharge, as evidenced by the idea that \"three days is not long enough.\" She reported not feeling ready for the patient to come home. She gave a vague report of the patient's recent circumstances with the break-up. She reported believing the patient had a tendency to do everything to an extreme, be it eating, smoking, dating, etc. 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 She reported believing the patient needed to take some responsibility for himself, and that his issues were \"a bunch of ridiculousness.\" Primarily, she reported believing the patient's medication regimen was causing him to hallucinate. She did not know what medication he was on, but believed it was too strong.     She had not spoken with the patient since admission. Suggested she could speak with the patient over the phone to help her see some improvement in the patient's condition. She was agreeable. The therapist will suggest the patient call her during phone time.     Facilitated safety planning and encouraged Karina to help the patient keep his appointments and take his medication as prescribed. She was agreeable, reporting she had been encouraging outpatient therapy for some time now.     7814  Spoke with patient. He had spoken with his parents, and they agreed to come pick him up.     Called Teodoro, the patient's father, and confirmed they will be on their way soon.   "

## 2019-10-07 NOTE — PLAN OF CARE
Problem: Patient Care Overview  Goal: Plan of Care Review  Outcome: Ongoing (interventions implemented as appropriate)   10/07/19 0304   Coping/Psychosocial   Plan of Care Reviewed With patient   Coping/Psychosocial   Patient Agreement with Plan of Care agrees   Plan of Care Review   Progress improving   OTHER   Outcome Summary Patient calm and cooperative. Patient out in dayroom area most of the evening socializing and watching television. Patient is animated with very loud speech. Rates anxiety and depression both a 5. Denies SI/HI or hallucinations.        Problem: Overarching Goals (Adult)  Goal: Adheres to Safety Considerations for Self and Others  Outcome: Ongoing (interventions implemented as appropriate)    Goal: Optimized Coping Skills in Response to Life Stressors  Outcome: Ongoing (interventions implemented as appropriate)    Goal: Develops/Participates in Therapeutic [place] to Support Successful Transition  Outcome: Ongoing (interventions implemented as appropriate)

## 2019-10-07 NOTE — DISCHARGE SUMMARY
"  Date of Discharge:  10/7/2019    Presenting Problem/History of Present Illness  MDD (major depressive disorder) [F32.9]     Hospital Course  Patient was hospitalized on 10/3/2019 after he presented to the emergency room reporting suicidal thoughts since last couple of days also reported auditory hallucinations and reported stressors as recent breakup with his boyfriend when he moved in with his parents, and also reported that his mother tended to be verbally abusive.  He was placed on special precautions level 3.  I saw him on 10/4/2019 when I did the initial history and physical examination, started him on Zoloft 50 mg daily and Risperdal half milligrams twice daily.  Patient also reported alcohol abuse and cocaine abuse.. I encourage cessation but he  declined referral to long-term drug rehab.  Patient was monitored \"monitored for alcohol withdrawal with ciwa score and was given thiamine.  Patient was seen by the on-call physician on the weekend of 10/5/2019 and 10/6/2019, no medication changes were made.  I resumed patient care on 10/7/2019 when he reported doing much better denied depression or anxiety, rated both at 1 on 1-10 scale, denied hallucinations or paranoia, was well oriented, said he had slept well and felt he was ready for discharge, he also  denied craving for any drugs or alcohol, denies any withdrawal symptoms and was well motivated to try to remain drug and alcohol free..  He said he will return home with his father, also said he felt he would be able to get along with his mother if he does not drink, but intended to move out of the home near a near future.  He was agreeable for outpatient psychiatric follow-up in [facility] by the therapist..    Procedures Performed         Consults:   Consults     No orders found from 9/4/2019 to 10/4/2019.          Pertinent Test Results: CMP showed elevated glucose at 133, anion gap at 16.6 slightly elevated ALT at 47  Serum magnesium level was normal " at 2.1  CBC was within normal limits  Hepatitis profile was nonreactive  Urinalysis was negative  Urine drug screen was negative  Serum alcohol level prior to admission was 51  EKG showed normal sinus rhythm        Discharge Disposition      Discharge Medications     Your medication list      ASK your doctor about these medications      Instructions Last Dose Given Next Dose Due   albuterol sulfate  (90 Base) MCG/ACT inhaler  Commonly known as:  PROVENTIL HFA;VENTOLIN HFA;PROAIR HFA      Inhale 2 puffs Every 4 (Four) Hours As Needed for Wheezing.       baclofen 10 MG tablet  Commonly known as:  LIORESAL      Take 10 mg by mouth 2 (Two) Times a Day.       hydrOXYzine pamoate 50 MG capsule  Commonly known as:  VISTARIL      Take 50 mg by mouth Every Night.       meloxicam 15 MG tablet  Commonly known as:  MOBIC      Take 15 mg by mouth Every Night.       omeprazole 20 MG capsule  Commonly known as:  priLOSEC      Take 20 mg by mouth Daily.              Lab Results (last 24 hours)     ** No results found for the last 24 hours. **            Discharge Diagnosis:  major  Depression recurrent with psychosis  Rule out bipolar disorder currently depressed    Alcohol abuse or dependence    Cocaine abuse or dependence with cocaine induced mood disorder            Bo Montana MD  10/07/19  9:48 AM

## 2021-03-23 ENCOUNTER — BULK ORDERING (OUTPATIENT)
Dept: CASE MANAGEMENT | Facility: OTHER | Age: 31
End: 2021-03-23

## 2021-03-23 DIAGNOSIS — Z23 IMMUNIZATION DUE: ICD-10-CM
